# Patient Record
Sex: MALE | Race: WHITE | Employment: PART TIME | URBAN - METROPOLITAN AREA
[De-identification: names, ages, dates, MRNs, and addresses within clinical notes are randomized per-mention and may not be internally consistent; named-entity substitution may affect disease eponyms.]

---

## 2022-04-01 ENCOUNTER — NURSE TRIAGE (OUTPATIENT)
Dept: FAMILY MEDICINE | Facility: CLINIC | Age: 37
End: 2022-04-01
Payer: COMMERCIAL

## 2022-04-01 NOTE — TELEPHONE ENCOUNTER
"Received call from patient's mother, Yonathan. She states that her son has been vomiting daily for 2 weeks. He reportedly cannot keep anything down, is very resistant to going into the Emergency Room. She is calling to schedule an appointment to establish care with her own primary care provider, Naida Cedeno. Jesus has never been seen at [facility] before. He was triaged- see below. He was advised to go to the emergency room per protocol. Yonathan states she has been trying to get patient to ER for 5 days, however he is resistant. Writer reinforced the fact that he needs to be evaluated for possible dehydration/infection and he verbalized understanding.     Reason for Disposition    SEVERE vomiting (e.g., 6 or more times/day) (Exception: patient sounds well, is drinking liquids, does not sound dehydrated, and vomiting has lasted less than 24 hours)    Additional Information    Negative: Shock suspected (e.g., cold/pale/clammy skin, too weak to stand, low BP, rapid pulse)    Negative: Difficult to awaken or acting confused (e.g., disoriented, slurred speech)    Negative: Sounds like a life-threatening emergency to the triager    Negative: Vomiting occurs only while coughing    Negative: Pregnant < 20 Weeks and nausea/vomiting began in early pregnancy (i.e., 4-8 weeks pregnant)    Negative: Chest pain    Negative: Headache is main symptom    Answer Assessment - Initial Assessment Questions  1. VOMITING SEVERITY: \"How many times have you vomited in the past 24 hours?\"      - MILD:  1 - 2 times/day     - MODERATE: 3 - 5 times/day, decreased oral intake without significant weight loss or symptoms of dehydration     - SEVERE: 6 or more times/day, vomits everything or nearly everything, with significant weight loss, symptoms of dehydration       Severe.   2. ONSET: \"When did the vomiting begin?\"       2 weeks ago.   3. FLUIDS: \"What fluids or food have you vomited up today?\" \"Have you been able to keep any fluids " "down?\"      Not able to keep any fluids down.   4. ABDOMINAL PAIN: \"Are your having any abdominal pain?\" If yes : \"How bad is it and what does it feel like?\" (e.g., crampy, dull, intermittent, constant)       Yes, constant abdominal pain. 8/10.  5. DIARRHEA: \"Is there any diarrhea?\" If so, ask: \"How many times today?\"       Yes. Once today. That has been going on for 2 weeks as well.   6. CONTACTS: \"Is there anyone else in the family with the same symptoms?\"       No.   7. CAUSE: \"What do you think is causing your vomiting?\"      Unsure.   8. HYDRATION STATUS: \"Any signs of dehydration?\" (e.g., dry mouth [not only dry lips], too weak to stand) \"When did you last urinate?\"      Dry mouth, lips and too weak to stand. Can make it to the bathroom and that's it. This morning, slight amount of urine.   9. OTHER SYMPTOMS: \"Do you have any other symptoms?\" (e.g., fever, headache, vertigo, vomiting blood or coffee grounds, recent head injury)      Headaches, hot flashes, vomiting blood (size of a jr usually in the morning).   10. PREGNANCY: \"Is there any chance you are pregnant?\" \"When was your last menstrual period?\"        N/A.    Protocols used: VOMITING-A-OH    LETICIA Henao RN  [facility] [place]  "

## 2022-04-15 ENCOUNTER — OFFICE VISIT (OUTPATIENT)
Dept: FAMILY MEDICINE | Facility: CLINIC | Age: 37
End: 2022-04-15
Payer: COMMERCIAL

## 2022-04-15 ENCOUNTER — TELEPHONE (OUTPATIENT)
Dept: FAMILY MEDICINE | Facility: CLINIC | Age: 37
End: 2022-04-15

## 2022-04-15 VITALS
DIASTOLIC BLOOD PRESSURE: 125 MMHG | BODY MASS INDEX: 17.89 KG/M2 | WEIGHT: 114 LBS | SYSTOLIC BLOOD PRESSURE: 167 MMHG | HEIGHT: 67 IN | HEART RATE: 120 BPM | TEMPERATURE: 98.3 F | OXYGEN SATURATION: 99 % | RESPIRATION RATE: 16 BRPM

## 2022-04-15 DIAGNOSIS — F10.20 ALCOHOLISM (H): ICD-10-CM

## 2022-04-15 DIAGNOSIS — K92.0 HEMATEMESIS WITH NAUSEA: Primary | ICD-10-CM

## 2022-04-15 DIAGNOSIS — R05.9 COUGH: ICD-10-CM

## 2022-04-15 DIAGNOSIS — J02.9 SORE THROAT: ICD-10-CM

## 2022-04-15 PROCEDURE — 99205 OFFICE O/P NEW HI 60 MIN: CPT | Performed by: NURSE PRACTITIONER

## 2022-04-15 RX ORDER — ONDANSETRON 4 MG/1
4 TABLET, ORALLY DISINTEGRATING ORAL EVERY 8 HOURS PRN
Qty: 18 TABLET | Refills: 0 | Status: CANCELLED | OUTPATIENT
Start: 2022-04-15

## 2022-04-15 RX ORDER — LIDOCAINE HYDROCHLORIDE 20 MG/ML
15 SOLUTION OROPHARYNGEAL EVERY 6 HOURS PRN
Qty: 100 ML | Refills: 0 | Status: CANCELLED | OUTPATIENT
Start: 2022-04-15

## 2022-04-15 ASSESSMENT — PAIN SCALES - GENERAL: PAINLEVEL: SEVERE PAIN (6)

## 2022-04-15 ASSESSMENT — PATIENT HEALTH QUESTIONNAIRE - PHQ9: SUM OF ALL RESPONSES TO PHQ QUESTIONS 1-9: 21

## 2022-04-15 NOTE — TELEPHONE ENCOUNTER
"Patient has appointment today. Mom calling to report history she thinks provider should be aware of. Patient has been vomiting multiple times daily since 3/18. Mom states \"he is a functioning alcoholic.\" He also has had 4 alcohol related hospital encounters this year.   Routing to provider as ROJELIO NORIEGA RN  [facility]     "

## 2022-04-15 NOTE — PROGRESS NOTES
"  Assessment & Plan     Hematemesis with nausea  Cough  Sore throat  Patient with alcoholism (currently drinking 1/2 bottle of whiskey daily) and history of LA grade D esophagitis and tear at GE junction last year. Now with 4 weeks of hematemesis 2-3 times/daily and continuous nausea limiting his ability to eat. Counseled patient that his symptoms and vital signs are very concerning and he needs to go to the Emergency Department for evaluation and possible hospitalization. Patient declines ambulance and states that he will have his mother take him the Emergency Department today.    Alcoholism (H)  Offered patient referral to addiction medicine but he declines at this time.     Return today (on 4/15/2022) for Go to Emergency Department.    KAREN Fagan CNP  [facility] GERRI Lee is a 36 year old who presents for the following health issues     HPI     Acute Illness  Acute illness concerns: Cough and Vomiting with blood  Onset/Duration: 4 weeks   Symptoms:  Fever: no  Chills/Sweats: YES  Headache (location?): YES  Sinus Pressure: no  Conjunctivitis:  YES- bilateral  Ear Pain: no  Rhinorrhea: YES  Congestion: YES  Sore Throat: YES  Cough: YES-productive of clear sputum, worse when trying to eat  Wheeze: YES  Decreased Appetite: YES  Nausea: YES, has been able to eat very little food  Vomiting: YES with blood in vomit 2-3 times/daily  Diarrhea: YES  Dysuria/Freq.: no  Dysuria or Hematuria: no  Fatigue/Achiness: YES  Sick/Strep Exposure: no  Therapies tried and outcome: OTC Flu medications and antacid - no help.     Is currently drinking 1/2 bottle of whiskey daily.       Review of Systems   Constitutional, HEENT, cardiovascular, pulmonary, GI, , musculoskeletal, neuro, skin, endocrine and psych systems are negative, except as otherwise noted.      Objective    BP (!) 167/125   Pulse 120   Temp 98.3  F (36.8  C) (Oral)   Resp 16   Ht 1.713 m (5' 7.44\")   Wt 51.7 kg " (114 lb)   SpO2 99%   BMI 17.62 kg/m    Body mass index is 17.62 kg/m .  Physical Exam   GENERAL: alert, frail and fatigued  EYES: PERRL, EOMI and conjunctiva/corneas- conjunctival injection bilateral  RESP: lungs clear to auscultation - no rales, rhonchi or wheezes  CV: regular rate and rhythm, normal S1 S2, no S3 or S4, no murmur, click or rub  ABDOMEN: tenderness epigastric and bowel sounds normal  PSYCH: tearful and fatigued

## 2022-04-25 ENCOUNTER — TELEPHONE (OUTPATIENT)
Dept: FAMILY MEDICINE | Facility: CLINIC | Age: 37
End: 2022-04-25

## 2022-04-25 ENCOUNTER — OFFICE VISIT (OUTPATIENT)
Dept: FAMILY MEDICINE | Facility: CLINIC | Age: 37
End: 2022-04-25
Payer: COMMERCIAL

## 2022-04-25 VITALS
RESPIRATION RATE: 18 BRPM | BODY MASS INDEX: 18.75 KG/M2 | HEART RATE: 109 BPM | TEMPERATURE: 98.7 F | HEIGHT: 67 IN | OXYGEN SATURATION: 97 % | SYSTOLIC BLOOD PRESSURE: 138 MMHG | WEIGHT: 119.5 LBS | DIASTOLIC BLOOD PRESSURE: 92 MMHG

## 2022-04-25 DIAGNOSIS — K12.30 STOMATITIS AND MUCOSITIS: ICD-10-CM

## 2022-04-25 DIAGNOSIS — F41.9 ANXIETY: ICD-10-CM

## 2022-04-25 DIAGNOSIS — K12.1 STOMATITIS AND MUCOSITIS: ICD-10-CM

## 2022-04-25 DIAGNOSIS — F10.20 SEVERE ALCOHOL USE DISORDER (H): ICD-10-CM

## 2022-04-25 DIAGNOSIS — Z23 HIGH PRIORITY FOR 2019-NCOV VACCINE: ICD-10-CM

## 2022-04-25 DIAGNOSIS — Z09 HOSPITAL DISCHARGE FOLLOW-UP: Primary | ICD-10-CM

## 2022-04-25 DIAGNOSIS — K29.21 CHRONIC ALCOHOLIC GASTRITIS WITH HEMORRHAGE: ICD-10-CM

## 2022-04-25 DIAGNOSIS — I10 BENIGN ESSENTIAL HYPERTENSION: ICD-10-CM

## 2022-04-25 DIAGNOSIS — F33.1 MODERATE EPISODE OF RECURRENT MAJOR DEPRESSIVE DISORDER (H): ICD-10-CM

## 2022-04-25 DIAGNOSIS — E53.9 VITAMIN B DEFICIENCY: ICD-10-CM

## 2022-04-25 DIAGNOSIS — R00.0 SINUS TACHYCARDIA: ICD-10-CM

## 2022-04-25 DIAGNOSIS — G47.00 PERSISTENT INSOMNIA: ICD-10-CM

## 2022-04-25 PROCEDURE — 99215 OFFICE O/P EST HI 40 MIN: CPT | Mod: 25 | Performed by: NURSE PRACTITIONER

## 2022-04-25 PROCEDURE — 0054A COVID-19,PF,PFIZER (12+ YRS): CPT | Performed by: NURSE PRACTITIONER

## 2022-04-25 PROCEDURE — 91305 COVID-19,PF,PFIZER (12+ YRS): CPT | Performed by: NURSE PRACTITIONER

## 2022-04-25 RX ORDER — PANTOPRAZOLE SODIUM 40 MG/1
40 TABLET, DELAYED RELEASE ORAL
COMMUNITY
Start: 2022-04-18 | End: 2022-04-25

## 2022-04-25 RX ORDER — ACAMPROSATE CALCIUM 333 MG/1
666 TABLET, DELAYED RELEASE ORAL
COMMUNITY
Start: 2022-04-18 | End: 2022-05-18

## 2022-04-25 RX ORDER — SERTRALINE HYDROCHLORIDE 100 MG/1
100 TABLET, FILM COATED ORAL DAILY
Qty: 90 TABLET | Refills: 1 | Status: ON HOLD | OUTPATIENT
Start: 2022-04-25 | End: 2022-06-25

## 2022-04-25 RX ORDER — HYDROXYZINE PAMOATE 25 MG/1
50 CAPSULE ORAL
COMMUNITY
Start: 2022-04-18 | End: 2022-04-25

## 2022-04-25 RX ORDER — PANTOPRAZOLE SODIUM 40 MG/1
40 TABLET, DELAYED RELEASE ORAL
Qty: 60 TABLET | Refills: 1 | Status: ON HOLD | OUTPATIENT
Start: 2022-04-25 | End: 2022-06-25

## 2022-04-25 RX ORDER — MULTIVITAMIN
1 TABLET ORAL DAILY
Qty: 90 TABLET | Refills: 3 | Status: SHIPPED | OUTPATIENT
Start: 2022-04-25

## 2022-04-25 RX ORDER — LANOLIN ALCOHOL/MO/W.PET/CERES
100 CREAM (GRAM) TOPICAL
COMMUNITY
End: 2022-04-25

## 2022-04-25 RX ORDER — METOPROLOL SUCCINATE 25 MG/1
25 TABLET, EXTENDED RELEASE ORAL DAILY
Qty: 90 TABLET | Refills: 1 | Status: SHIPPED | OUTPATIENT
Start: 2022-04-25

## 2022-04-25 RX ORDER — LANOLIN ALCOHOL/MO/W.PET/CERES
100 CREAM (GRAM) TOPICAL DAILY
Qty: 90 TABLET | Refills: 3 | Status: SHIPPED | OUTPATIENT
Start: 2022-04-25

## 2022-04-25 RX ORDER — HYDROXYZINE PAMOATE 50 MG/1
50 CAPSULE ORAL 4 TIMES DAILY PRN
Qty: 90 CAPSULE | Refills: 1 | Status: SHIPPED | OUTPATIENT
Start: 2022-04-25

## 2022-04-25 RX ORDER — NICOTINE 14 MG/24H
PATCH, EXTENDED RELEASE TRANSDERMAL
COMMUNITY
Start: 2022-04-18

## 2022-04-25 RX ORDER — PANTOPRAZOLE SODIUM 40 MG/1
TABLET, DELAYED RELEASE ORAL
COMMUNITY
Start: 2022-04-18 | End: 2022-04-25

## 2022-04-25 ASSESSMENT — PATIENT HEALTH QUESTIONNAIRE - PHQ9
SUM OF ALL RESPONSES TO PHQ QUESTIONS 1-9: 10
SUM OF ALL RESPONSES TO PHQ QUESTIONS 1-9: 10
10. IF YOU CHECKED OFF ANY PROBLEMS, HOW DIFFICULT HAVE THESE PROBLEMS MADE IT FOR YOU TO DO YOUR WORK, TAKE CARE OF THINGS AT HOME, OR GET ALONG WITH OTHER PEOPLE: NOT DIFFICULT AT ALL

## 2022-04-25 ASSESSMENT — ANXIETY QUESTIONNAIRES
5. BEING SO RESTLESS THAT IT IS HARD TO SIT STILL: SEVERAL DAYS
GAD7 TOTAL SCORE: 7
GAD7 TOTAL SCORE: 7
6. BECOMING EASILY ANNOYED OR IRRITABLE: SEVERAL DAYS
4. TROUBLE RELAXING: SEVERAL DAYS
1. FEELING NERVOUS, ANXIOUS, OR ON EDGE: SEVERAL DAYS
3. WORRYING TOO MUCH ABOUT DIFFERENT THINGS: SEVERAL DAYS
7. FEELING AFRAID AS IF SOMETHING AWFUL MIGHT HAPPEN: SEVERAL DAYS
7. FEELING AFRAID AS IF SOMETHING AWFUL MIGHT HAPPEN: SEVERAL DAYS
2. NOT BEING ABLE TO STOP OR CONTROL WORRYING: SEVERAL DAYS
GAD7 TOTAL SCORE: 7

## 2022-04-25 NOTE — TELEPHONE ENCOUNTER
Left message stating if patient would like to  his letter or have it emailed.       Ann Marie Sandoval MA

## 2022-04-25 NOTE — TELEPHONE ENCOUNTER
Reached out to [facility] pharmacy staff to notify them of provider's message as written.    Radha Hidalgo, JAIRON RN  [facility]

## 2022-04-25 NOTE — TELEPHONE ENCOUNTER
[facility] calling to confirm that it is ok to dispense 1:1:1 ratio of Magic Mouthwash as it is a compound medication. Please advise.     Marimar Olsen RN   [facility]

## 2022-04-25 NOTE — PROGRESS NOTES
Assessment & Plan     1. Hospital discharge follow-up  Hospital admission at [facility] 4/15-4/18 alcoholic gastritis with hemorrhage.    2. Chronic alcoholic gastritis with hemorrhage  Hospital admission at [facility] 4/15-4/18 alcoholic gastritis with hemorrhage. Treated with IV PPI and bleeding stopped. Discharged on oral pantoprazole. Bleeding has not reoccurred and he has been able to eat foods and drink water without vomiting. Continue pantoprazole 40 mg BID for the next 6-8 weeks. Counseled patient that abstaining from alcohol is recommended to prevent worsening symptoms, future hospitalizations, or early death.   - pantoprazole (PROTONIX) 40 MG EC tablet; Take 1 tablet (40 mg) by mouth 2 times daily (before meals)  Dispense: 60 tablet; Refill: 1  - Adult Mental Health  Referral; Future  - Adult Mental Health  Referral; Future    3. Severe alcohol use disorder (H)  Patient drinking 1/2-1 bottle of whiskey daily. Last drink was 3-4 glasses yesterday. Patient has not noticed a reduction in alcohol cravings with acamprosate. Will continue the acamprosate for now and patient agrees to addiction medicine referral to discuss options for treatment. He states that he will not consider in-patient treatment but will consider outpatient treatment, therapy, and medications.   - Adult Mental Health  Referral; Future  - Adult Mental Health  Referral; Future    4. Stomatitis and mucositis  Magic mouthwash is helping mouth and throat pain so that he is able to eat and drink water. Continue PRN.  - magic mouthwash suspension (diphenhydrAMINE, lidocaine, aluminum-magnesium & simethicone); Swish and spit 10 mLs in mouth every 6 hours as needed for mouth sores or mild pain  Dispense: 120 mL; Refill: 3    5. Vitamin B deficiency  Continue supplements below  - thiamine (B-1) 100 MG tablet; Take 1 tablet (100 mg) by mouth daily  Dispense: 90 tablet; Refill: 3  - vitamin B-12 (CYANOCOBALAMIN) 1000 MCG  tablet; Take 1 tablet (1,000 mcg) by mouth daily  Dispense: 90 tablet; Refill: 3  - multivitamin (ONE-DAILY) tablet; Take 1 tablet by mouth daily  Dispense: 90 tablet; Refill: 3    6. Moderate episode of recurrent major depressive disorder (H)  Has not noticed improvement in depression symptoms with sertraline but denies side effects. Will increase dose as noted below. He is interested in starting therapy.   - sertraline (ZOLOFT) 100 MG tablet; Take 1 tablet (100 mg) by mouth daily  Dispense: 90 tablet; Refill: 1  [facility]; [facility]  - [facility] Referral; Future    7. Anxiety  Hydroxyzine helps with anxiety. Continue PRN.   - sertraline (ZOLOFT) 100 MG tablet; Take 1 tablet (100 mg) by mouth daily  Dispense: 90 tablet; Refill: 1  - hydrOXYzine (VISTARIL) 50 MG capsule; Take 1 capsule (50 mg) by mouth 4 times daily as needed for anxiety  Dispense: 90 capsule; Refill: 1  - [facility] Referral; Future  - [facility] Referral; Future    8. Persistent insomnia  Trazodone did not help with his insomnia. He has been taking Tylenol PM. I would like him to avoid the Tylenol to protect his liver. Will send Rx for doxylamine, if not covered by insurance he can pick this up OTC.   - [facility] Referral; Future  - doxylamine (UNISOM) 25 MG TABS tablet; Take 2 tablets (50 mg) by mouth nightly as needed for sleep  Dispense: 60 tablet; Refill: 1  - [facility] Referral; Future    9. Benign essential hypertension  This is a new diagnosis for him. BPs elevated prior to and during hospitalization and remain elevated. He also has resting sinus tachycardia. Want to avoid CCB at this time to protect his liver so will start BB therapy for both BP and HR control.  - metoprolol succinate ER (TOPROL-XL) 25 MG 24 hr tablet; Take 1 tablet (25 mg) by mouth daily  Dispense: 90 tablet; Refill: 1    10. Sinus tachycardia  See  above.   - metoprolol succinate ER (TOPROL-XL) 25 MG 24 hr tablet; Take 1 tablet (25 mg) by mouth daily  Dispense: 90 tablet; Refill: 1    11. High priority for 2019-nCoV vaccine  - COVID-19,PF,PFIZER (12+ Yrs GRAY LABEL)    Review of external notes as documented elsewhere in note  Review of the result(s) of each unique test - hospital labs (k, mag, cbc, liver function, etc)  Diagnosis or treatment significantly limited by social determinants of health - addiction  Prescription drug management  56 minutes spent on the date of the encounter doing chart review, history and exam, documentation and further activities per the note     Tobacco Cessation:   reports that he has been smoking cigarettes. He has never used smokeless tobacco.  Tobacco Cessation Action Plan: Information offered: Patient not interested at this time    Depression Screening Follow Up    PHQ 4/25/2022   PHQ-9 Total Score 10   Q9: Thoughts of better off dead/self-harm past 2 weeks Not at all     Last PHQ-9 4/25/2022   1.  Little interest or pleasure in doing things 1   2.  Feeling down, depressed, or hopeless 3   3.  Trouble falling or staying asleep, or sleeping too much 3   4.  Feeling tired or having little energy 1   5.  Poor appetite or overeating 1   6.  Feeling bad about yourself 0   7.  Trouble concentrating 1   8.  Moving slowly or restless 0   Q9: Thoughts of better off dead/self-harm past 2 weeks 0   PHQ-9 Total Score 10   Difficulty at work, home, or with people -       Follow Up Actions Taken  Mental Health Referral placed  Follow up recommended: as noted below     Return in about 4 weeks (around 5/23/2022) for hypertension and depression.    KAREN Fagan [facility] GERRI Lee is a 36 year old who presents for the following health issues  accompanied by his none.    History of Present Illness       Mental Health Follow-up:  Patient presents to follow-up on Depression & Anxiety.Patient's  depression since last visit has been:  Medium  The patient is having other symptoms associated with depression.  Patient's anxiety since last visit has been:  Medium  The patient is not having other symptoms associated with anxiety.  Any significant life events: health concerns  Patient is not feeling anxious or having panic attacks.  Patient has concerns about alcohol or drug use.       Today's PHQ-9         PHQ-9 Total Score: 10  PHQ-9 Q9 Thoughts of better off dead/self-harm past 2 weeks :   (P) Not at all    How difficult have these problems made it for you to do your work, take care of things at home, or get along with other people: Not difficult at all    Today's GENA-7 Score: 7    Hypertension: He presents for follow up of hypertension.  He does check blood pressure  regularly outside of the clinic. Outside blood pressures have been over 140/90. He does not follow a low salt diet.     He eats 0-1 servings of fruits and vegetables daily.He consumes 1 sweetened beverage(s) daily.He exercises with enough effort to increase his heart rate 20 to 29 minutes per day.  He exercises with enough effort to increase his heart rate 7 days per week.   He is taking medications regularly.         Hospital Follow-up Visit:    Hospital/Nursing Home/IP Rehab Facility: [facility]  Date of Admission: 4/15/22  Date of Discharge: 4/18/22  Reason(s) for Admission: alcoholic gastritis, Alcohol withdrawal       Was your hospitalization related to COVID-19? No   Problems taking medications regularly:  None  Medication changes since discharge:   Discharge Medications       Your Home Medicines     START taking these medicines   Instructions   acamprosate 333 mg tablet  For diagnoses: Alcohol dependence with other alcohol-induced disorder (HC)  Commonly known as: CAMPRAL  Take 2 Tablets (666 mg) by mouth 3 times daily.    hydrOXYzine pamoate 25 mg capsule  For diagnoses: Anxiety  Commonly known as: VISTARIL  Take 2 Capsules (50  mg) by mouth every 6 hours if needed for Anxiety.    magic mouthwash with nystatin and lidocaine Susp  For diagnoses: Stomatitis  Commonly known as: [facility] SPECIAL MIXTURE  Take 5-10 mL by mouth 4 times daily if needed for Stomatitis.    nicotine 14 mg/24 hr 14 mg/24 hr patch  For diagnoses: Tobacco abuse  Start taking on: April 19, 2022  Commonly known as: NICODERM; HABITROL  Apply 1 Patch on dry, clean, hairless skin once daily.    pantoprazole 40 mg delayed-release tablet  For diagnoses: Gastritis, presence of bleeding unspecified, unspecified chronicity, unspecified gastritis type  Commonly known as: PROTONIX  Take 1 Tablet (40 mg) by mouth 2 times daily before meals.  Doctor's comments: Can switch to equivalent dose of omeprazole if more affordable    sertraline 50 mg tablet  For diagnoses: Depression, unspecified depression type  Start taking on: April 19, 2022  Commonly known as: ZOLOFT  Take 1 Tablet (50 mg) by mouth every morning.    traZODone 50 mg tablet  For diagnoses: Insomnia, unspecified type  Commonly known as: DESYREL  Take 1 Tablet (50 mg) by mouth at bedtime if needed for Sleep.      CONTINUE taking these medicines   Instructions   cyanocobalamin 1,000 mcg tablet  Commonly known as: VITAMIN B12  Take 1,000 mcg by mouth once daily.    MEN'S DAILY MULTIVIT-MINERAL ORAL  Take 1 Tablet by mouth once daily.    Vitamin B-1 100 mg tablet  Generic drug: thiamine  Take 100 mg by mouth once daily.      STOP taking these medicines   famotidine 10 mg tablet  Commonly known as: PEPCID    Tylenol PM Extra Strength  mg tablet  Generic drug: diphenhydrAMINE-acetaminophen  mg    Patient stopped the trazodone because he was waking in the middle of the night, tossing, turning so he stopped it and no change in his sleep.     Last alcohol was 3-4 drinks yesterday.     Problems adhering to non-medication therapy:  None    Summary of hospitalization:  CareEverywhere information obtained and reviewed  Hospital  "Course     Jesus Martinez is a 36 y.o. male with a history of Alcohol use disorder who presented with 3 weeks of epigastric pain, intractable vomiting, and melena.    Treated for gastritis with IV ppi as well as supportive care, intravenous fluids resuscitation. Melena resolved. Patient reported ongoing anxiety, depression, and insomnia. He was started on a medication regimen for the like with plan for close follow up.     He was proved resources for alcohol cessation, chose to pursue outpatient treatment.     Recommendations for Outpatient Provider   PCP: [facility]     Recommendations for outpatient provider   Specific recommendations to be addressed at the follow up visit:  Gastritis with melena - Treating with twice daily PPI - can reduce to once daily in the next 4-6 weeks   Anxiety, depression, insomnia - ordered a regimen, titrate as needed  Alcohol dependence - trying acamprosate for cravings, outpatient chem dep program    Medication regimen changes: see \"Hospital Course\" above.    Follow-up labs/imaging: none    Other specialty follow-up not included in DC orders: None    Special considerations: none.    Functional evaluations:   Fall Risk: Total Score (If 5 or > is High Risk): 3 (04/18/22 0915)  NuDESC (>/=2 abnormal): 0 (04/18/22 0915)   MOCA: // SLUMS:      Diagnostic Tests/Treatments reviewed.  Follow up needed: addiction medicine  Other Healthcare Providers Involved in Patient s Care:         None  Update since discharge: improved.     Post Discharge Medication Reconciliation: discharge medications reconciled and changed, per note/orders.  Plan of care communicated with patient          Review of Systems   Constitutional, HEENT, cardiovascular, pulmonary, gi and gu systems are negative, except as otherwise noted.      Objective    BP (!) 138/92   Pulse 109   Temp 98.7  F (37.1  C) (Oral)   Resp 18   Ht 1.713 m (5' 7.44\")   Wt 54.2 kg (119 lb 8 oz)   SpO2 97%   BMI 18.47 kg/m  "   Body mass index is 18.47 kg/m .  Physical Exam   GENERAL: healthy, alert and no distress  EYES: Eyes grossly normal to inspection, PERRL and conjunctivae and sclerae normal  NEURO: Normal strength and tone, mentation intact and speech normal  PSYCH: mentation appears normal, affect normal/bright

## 2022-04-26 ASSESSMENT — ANXIETY QUESTIONNAIRES: GAD7 TOTAL SCORE: 7

## 2022-04-26 ASSESSMENT — PATIENT HEALTH QUESTIONNAIRE - PHQ9: SUM OF ALL RESPONSES TO PHQ QUESTIONS 1-9: 10

## 2022-05-25 ENCOUNTER — NURSE TRIAGE (OUTPATIENT)
Dept: NURSING | Facility: CLINIC | Age: 37
End: 2022-05-25
Payer: COMMERCIAL

## 2022-05-25 NOTE — TELEPHONE ENCOUNTER
Called and spoke with Mother, Yonathan. Pt gave verbal consent to speak with her. Pt was given provider's message as written, then pt asked for his Mom to go on the line. Yonathan states that pt went to the ER a few days ago and they sent him home with allergy medication. Pt did not have a good experience in ER. Mom states she feels pt is stereotyped when he is in the ER because of his history of alcoholism.   States this issue has been going on since 3/18. Can't eat anything. Is only drinking water. Is concerned about his nutrition. Mouth is bleeding. Pt is crying because he's in so much pain. Offered an appt for today. Mother declined stating she would like pt to be seen by an MD and has an appt scheduled for tomorrow with Dr. Ashley. States she will bring pt to  of  ER today if symptoms worsen. She will also try liquid antacid such as mylanta to coat his mouth and see if she can get him to eat some soft foods. Could try ensure. Mom has given him pedialyte and did try ice cream yesterday.    Martha Doss RN  [facility]

## 2022-05-25 NOTE — TELEPHONE ENCOUNTER
Note there is no consent to communicate with pt's Mom. Will need verbal consent from pt.     Routing to PCP to advise. No openings with PCP today.    Martha Doss RN  [facility]

## 2022-05-25 NOTE — TELEPHONE ENCOUNTER
Provider Response to 2nd Level Triage Request    I have reviewed the RN documentation. My recommendation is:  Virtual Visit or inperson visit within the next week with me.  Go to ED if develops vomiting, blood in vomit or stool, fever, inability to keep any food or water down.    Naida Cedeno, APRN, CNP

## 2022-05-25 NOTE — TELEPHONE ENCOUNTER
"Mom calling.  Patient saw Naida Cedeno and told to see in ER. He has an infection in mouth. Was hospitalized. Took meds, followed up with Naida. Went to ER over the weekend. No one listened to him but treated him for an allergy. Concern is he's skinny and weighs less than 100 lbs. He's not been able to eat.  Should he be seen again by Naida? Nothing is working. He needs help. After triage, I recommended an appointment today as well as a call back from Naida Cedeno, CNP, team. He doesn't want to go back to the ER because they only treated allergies. Yonathan, Mom, can be reached at:  [phone]. May leave a detailed message. I told her to take the first available appointment with any provider.  Irene Alegre RN  [facility]      Reason for Disposition    Gums are red, painful and have many ulcers    Additional Information    Negative: [1] Looks like fever blisters (\"cold sore\") AND [2] only on outer lip    Negative: Generalized skin rash from Chickenpox    Negative: Chemical in the mouth suspected cause of ulcers    Negative: [1] Drinking very little AND [2] dehydration suspected (e.g., no urine > 12 hours, very dry mouth, very lightheaded)    Negative: Generalized rash on body    Negative: Patient sounds very sick or weak to the triager    Negative: Large blisters in mouth (i.e., fluid filled bubbles or sacs)    Protocols used: MOUTH ULCERS-A-AH      "

## 2022-06-20 ENCOUNTER — APPOINTMENT (OUTPATIENT)
Dept: ULTRASOUND IMAGING | Facility: CLINIC | Age: 37
End: 2022-06-20
Attending: EMERGENCY MEDICINE
Payer: COMMERCIAL

## 2022-06-20 ENCOUNTER — HOSPITAL ENCOUNTER (INPATIENT)
Facility: CLINIC | Age: 37
LOS: 5 days | Discharge: HOME | End: 2022-06-25
Attending: EMERGENCY MEDICINE | Admitting: INTERNAL MEDICINE
Payer: COMMERCIAL

## 2022-06-20 DIAGNOSIS — Z20.822 COVID-19 RULED OUT BY LABORATORY TESTING: ICD-10-CM

## 2022-06-20 DIAGNOSIS — F10.20 ALCOHOLISM (H): Primary | ICD-10-CM

## 2022-06-20 DIAGNOSIS — E87.6 HYPOKALEMIA: ICD-10-CM

## 2022-06-20 DIAGNOSIS — F10.20 SEVERE ALCOHOL DEPENDENCE (H): ICD-10-CM

## 2022-06-20 DIAGNOSIS — R79.89 ELEVATED LIVER FUNCTION TESTS: ICD-10-CM

## 2022-06-20 DIAGNOSIS — K29.00 ACUTE SUPERFICIAL GASTRITIS WITHOUT HEMORRHAGE: ICD-10-CM

## 2022-06-20 DIAGNOSIS — E83.42 HYPOMAGNESEMIA: ICD-10-CM

## 2022-06-20 DIAGNOSIS — F41.9 ANXIETY: ICD-10-CM

## 2022-06-20 DIAGNOSIS — B37.0 THRUSH: ICD-10-CM

## 2022-06-20 DIAGNOSIS — F10.932 ALCOHOL WITHDRAWAL SYNDROME WITH PERCEPTUAL DISTURBANCE (H): ICD-10-CM

## 2022-06-20 DIAGNOSIS — R94.31 PROLONGED Q-T INTERVAL ON ECG: ICD-10-CM

## 2022-06-20 DIAGNOSIS — K90.9 DIARRHEA DUE TO MALABSORPTION: ICD-10-CM

## 2022-06-20 DIAGNOSIS — R19.7 DIARRHEA DUE TO MALABSORPTION: ICD-10-CM

## 2022-06-20 DIAGNOSIS — F33.1 MODERATE EPISODE OF RECURRENT MAJOR DEPRESSIVE DISORDER (H): ICD-10-CM

## 2022-06-20 LAB
ALBUMIN SERPL-MCNC: 4.2 G/DL (ref 3.4–5)
ALBUMIN UR-MCNC: NEGATIVE MG/DL
ALP SERPL-CCNC: 260 U/L (ref 40–150)
ALT SERPL W P-5'-P-CCNC: 128 U/L (ref 0–70)
AMPHETAMINES UR QL SCN: ABNORMAL
ANION GAP SERPL CALCULATED.3IONS-SCNC: 18 MMOL/L (ref 3–14)
APPEARANCE UR: CLEAR
AST SERPL W P-5'-P-CCNC: 294 U/L (ref 0–45)
BARBITURATES UR QL: ABNORMAL
BASOPHILS # BLD AUTO: 0.2 10E3/UL (ref 0–0.2)
BASOPHILS NFR BLD AUTO: 1 %
BENZODIAZ UR QL: ABNORMAL
BILIRUB SERPL-MCNC: 4.8 MG/DL (ref 0.2–1.3)
BILIRUB UR QL STRIP: NEGATIVE
BUN SERPL-MCNC: 4 MG/DL (ref 7–30)
CALCIUM SERPL-MCNC: 9.8 MG/DL (ref 8.5–10.1)
CANNABINOIDS UR QL SCN: ABNORMAL
CHLORIDE BLD-SCNC: 97 MMOL/L (ref 94–109)
CO2 SERPL-SCNC: 21 MMOL/L (ref 20–32)
COCAINE UR QL: ABNORMAL
COLOR UR AUTO: ABNORMAL
CREAT SERPL-MCNC: 0.94 MG/DL (ref 0.66–1.25)
EOSINOPHIL # BLD AUTO: 0 10E3/UL (ref 0–0.7)
EOSINOPHIL NFR BLD AUTO: 0 %
ERYTHROCYTE [DISTWIDTH] IN BLOOD BY AUTOMATED COUNT: 12 % (ref 10–15)
ETHANOL SERPL-MCNC: <0.01 G/DL
GFR SERPL CREATININE-BSD FRML MDRD: >90 ML/MIN/1.73M2
GLUCOSE BLD-MCNC: 131 MG/DL (ref 70–99)
GLUCOSE UR STRIP-MCNC: NEGATIVE MG/DL
HCT VFR BLD AUTO: 47.8 % (ref 40–53)
HGB BLD-MCNC: 17.4 G/DL (ref 13.3–17.7)
HGB UR QL STRIP: NEGATIVE
HOLD SPECIMEN: NORMAL
HYALINE CASTS: 38 /LPF
IMM GRANULOCYTES # BLD: 0.1 10E3/UL
IMM GRANULOCYTES NFR BLD: 0 %
INR PPP: 0.98 (ref 0.85–1.15)
KETONES BLD-SCNC: 0.8 MMOL/L (ref 0–0.6)
KETONES UR STRIP-MCNC: NEGATIVE MG/DL
LEUKOCYTE ESTERASE UR QL STRIP: NEGATIVE
LIPASE SERPL-CCNC: 206 U/L (ref 73–393)
LYMPHOCYTES # BLD AUTO: 0.7 10E3/UL (ref 0.8–5.3)
LYMPHOCYTES NFR BLD AUTO: 4 %
MAGNESIUM SERPL-MCNC: 1.4 MG/DL (ref 1.6–2.3)
MAGNESIUM SERPL-MCNC: 2.4 MG/DL (ref 1.6–2.3)
MCH RBC QN AUTO: 34.4 PG (ref 26.5–33)
MCHC RBC AUTO-ENTMCNC: 36.4 G/DL (ref 31.5–36.5)
MCV RBC AUTO: 95 FL (ref 78–100)
MONOCYTES # BLD AUTO: 1.9 10E3/UL (ref 0–1.3)
MONOCYTES NFR BLD AUTO: 12 %
MUCOUS THREADS #/AREA URNS LPF: PRESENT /LPF
NEUTROPHILS # BLD AUTO: 13.2 10E3/UL (ref 1.6–8.3)
NEUTROPHILS NFR BLD AUTO: 83 %
NITRATE UR QL: NEGATIVE
NRBC # BLD AUTO: 0 10E3/UL
NRBC BLD AUTO-RTO: 0 /100
OPIATES UR QL SCN: ABNORMAL
PH UR STRIP: 7.5 [PH] (ref 5–7)
PHOSPHATE SERPL-MCNC: 2.4 MG/DL (ref 2.5–4.5)
PLATELET # BLD AUTO: 217 10E3/UL (ref 150–450)
POTASSIUM BLD-SCNC: 2.9 MMOL/L (ref 3.4–5.3)
POTASSIUM BLD-SCNC: 2.9 MMOL/L (ref 3.4–5.3)
PROT SERPL-MCNC: 8.7 G/DL (ref 6.8–8.8)
RBC # BLD AUTO: 5.06 10E6/UL (ref 4.4–5.9)
RBC URINE: 1 /HPF
SARS-COV-2 RNA RESP QL NAA+PROBE: NEGATIVE
SODIUM SERPL-SCNC: 136 MMOL/L (ref 133–144)
SP GR UR STRIP: 1.01 (ref 1–1.03)
UROBILINOGEN UR STRIP-MCNC: NORMAL MG/DL
WBC # BLD AUTO: 16 10E3/UL (ref 4–11)
WBC URINE: 0 /HPF

## 2022-06-20 PROCEDURE — 120N000003 HC R&B IMCU UMMC

## 2022-06-20 PROCEDURE — 99285 EMERGENCY DEPT VISIT HI MDM: CPT | Mod: 25 | Performed by: EMERGENCY MEDICINE

## 2022-06-20 PROCEDURE — 84132 ASSAY OF SERUM POTASSIUM: CPT

## 2022-06-20 PROCEDURE — 93010 ELECTROCARDIOGRAM REPORT: CPT | Performed by: EMERGENCY MEDICINE

## 2022-06-20 PROCEDURE — 99292 CRITICAL CARE ADDL 30 MIN: CPT | Mod: 25 | Performed by: EMERGENCY MEDICINE

## 2022-06-20 PROCEDURE — 96366 THER/PROPH/DIAG IV INF ADDON: CPT | Performed by: EMERGENCY MEDICINE

## 2022-06-20 PROCEDURE — C9803 HOPD COVID-19 SPEC COLLECT: HCPCS | Performed by: EMERGENCY MEDICINE

## 2022-06-20 PROCEDURE — 76705 ECHO EXAM OF ABDOMEN: CPT

## 2022-06-20 PROCEDURE — 36415 COLL VENOUS BLD VENIPUNCTURE: CPT | Performed by: EMERGENCY MEDICINE

## 2022-06-20 PROCEDURE — 96375 TX/PRO/DX INJ NEW DRUG ADDON: CPT | Performed by: EMERGENCY MEDICINE

## 2022-06-20 PROCEDURE — 99223 1ST HOSP IP/OBS HIGH 75: CPT | Mod: AI | Performed by: INTERNAL MEDICINE

## 2022-06-20 PROCEDURE — 99291 CRITICAL CARE FIRST HOUR: CPT | Mod: 25 | Performed by: EMERGENCY MEDICINE

## 2022-06-20 PROCEDURE — 81001 URINALYSIS AUTO W/SCOPE: CPT | Performed by: EMERGENCY MEDICINE

## 2022-06-20 PROCEDURE — 96368 THER/DIAG CONCURRENT INF: CPT | Performed by: EMERGENCY MEDICINE

## 2022-06-20 PROCEDURE — 84100 ASSAY OF PHOSPHORUS: CPT | Performed by: EMERGENCY MEDICINE

## 2022-06-20 PROCEDURE — 36415 COLL VENOUS BLD VENIPUNCTURE: CPT

## 2022-06-20 PROCEDURE — 76705 ECHO EXAM OF ABDOMEN: CPT | Mod: 26 | Performed by: RADIOLOGY

## 2022-06-20 PROCEDURE — U0003 INFECTIOUS AGENT DETECTION BY NUCLEIC ACID (DNA OR RNA); SEVERE ACUTE RESPIRATORY SYNDROME CORONAVIRUS 2 (SARS-COV-2) (CORONAVIRUS DISEASE [COVID-19]), AMPLIFIED PROBE TECHNIQUE, MAKING USE OF HIGH THROUGHPUT TECHNOLOGIES AS DESCRIBED BY CMS-2020-01-R: HCPCS | Performed by: EMERGENCY MEDICINE

## 2022-06-20 PROCEDURE — 85025 COMPLETE CBC W/AUTO DIFF WBC: CPT | Performed by: EMERGENCY MEDICINE

## 2022-06-20 PROCEDURE — 83690 ASSAY OF LIPASE: CPT | Performed by: EMERGENCY MEDICINE

## 2022-06-20 PROCEDURE — 93005 ELECTROCARDIOGRAM TRACING: CPT | Performed by: EMERGENCY MEDICINE

## 2022-06-20 PROCEDURE — 83735 ASSAY OF MAGNESIUM: CPT | Performed by: EMERGENCY MEDICINE

## 2022-06-20 PROCEDURE — HZ2ZZZZ DETOXIFICATION SERVICES FOR SUBSTANCE ABUSE TREATMENT: ICD-10-PCS | Performed by: EMERGENCY MEDICINE

## 2022-06-20 PROCEDURE — 80307 DRUG TEST PRSMV CHEM ANLYZR: CPT | Performed by: EMERGENCY MEDICINE

## 2022-06-20 PROCEDURE — 96361 HYDRATE IV INFUSION ADD-ON: CPT | Performed by: EMERGENCY MEDICINE

## 2022-06-20 PROCEDURE — 999N000128 HC STATISTIC PERIPHERAL IV START W/O US GUIDANCE

## 2022-06-20 PROCEDURE — 250N000011 HC RX IP 250 OP 636: Performed by: EMERGENCY MEDICINE

## 2022-06-20 PROCEDURE — 250N000009 HC RX 250: Performed by: EMERGENCY MEDICINE

## 2022-06-20 PROCEDURE — 82010 KETONE BODYS QUAN: CPT | Performed by: EMERGENCY MEDICINE

## 2022-06-20 PROCEDURE — 250N000013 HC RX MED GY IP 250 OP 250 PS 637: Performed by: EMERGENCY MEDICINE

## 2022-06-20 PROCEDURE — 85610 PROTHROMBIN TIME: CPT | Performed by: EMERGENCY MEDICINE

## 2022-06-20 PROCEDURE — 80053 COMPREHEN METABOLIC PANEL: CPT | Performed by: EMERGENCY MEDICINE

## 2022-06-20 PROCEDURE — 82077 ASSAY SPEC XCP UR&BREATH IA: CPT | Performed by: EMERGENCY MEDICINE

## 2022-06-20 PROCEDURE — 258N000003 HC RX IP 258 OP 636: Performed by: EMERGENCY MEDICINE

## 2022-06-20 PROCEDURE — 96365 THER/PROPH/DIAG IV INF INIT: CPT | Performed by: EMERGENCY MEDICINE

## 2022-06-20 PROCEDURE — 250N000011 HC RX IP 250 OP 636: Performed by: PHYSICIAN ASSISTANT

## 2022-06-20 PROCEDURE — 83735 ASSAY OF MAGNESIUM: CPT

## 2022-06-20 PROCEDURE — 99207 PR APP CREDIT; MD BILLING SHARED VISIT: CPT

## 2022-06-20 RX ORDER — FLUMAZENIL 0.1 MG/ML
0.2 INJECTION, SOLUTION INTRAVENOUS
Status: DISCONTINUED | OUTPATIENT
Start: 2022-06-20 | End: 2022-06-25 | Stop reason: HOSPADM

## 2022-06-20 RX ORDER — POTASSIUM CHLORIDE 7.45 MG/ML
10 INJECTION INTRAVENOUS
Status: COMPLETED | OUTPATIENT
Start: 2022-06-20 | End: 2022-06-21

## 2022-06-20 RX ORDER — POTASSIUM CHLORIDE 7.45 MG/ML
10 INJECTION INTRAVENOUS ONCE
Status: COMPLETED | OUTPATIENT
Start: 2022-06-20 | End: 2022-06-20

## 2022-06-20 RX ORDER — HALOPERIDOL 5 MG/ML
2.5-5 INJECTION INTRAMUSCULAR EVERY 6 HOURS PRN
Status: DISCONTINUED | OUTPATIENT
Start: 2022-06-20 | End: 2022-06-20

## 2022-06-20 RX ORDER — FOLIC ACID 1 MG/1
1 TABLET ORAL DAILY
Status: DISCONTINUED | OUTPATIENT
Start: 2022-06-21 | End: 2022-06-25 | Stop reason: HOSPADM

## 2022-06-20 RX ORDER — ONDANSETRON 2 MG/ML
4 INJECTION INTRAMUSCULAR; INTRAVENOUS ONCE
Status: COMPLETED | OUTPATIENT
Start: 2022-06-20 | End: 2022-06-20

## 2022-06-20 RX ORDER — MULTIPLE VITAMINS W/ MINERALS TAB 9MG-400MCG
1 TAB ORAL DAILY
Status: DISCONTINUED | OUTPATIENT
Start: 2022-06-21 | End: 2022-06-25 | Stop reason: HOSPADM

## 2022-06-20 RX ORDER — FLUMAZENIL 0.1 MG/ML
0.2 INJECTION, SOLUTION INTRAVENOUS
Status: DISCONTINUED | OUTPATIENT
Start: 2022-06-20 | End: 2022-06-20

## 2022-06-20 RX ORDER — LIDOCAINE 40 MG/G
CREAM TOPICAL
Status: DISCONTINUED | OUTPATIENT
Start: 2022-06-20 | End: 2022-06-25 | Stop reason: HOSPADM

## 2022-06-20 RX ORDER — DIAZEPAM 5 MG
10 TABLET ORAL EVERY 30 MIN PRN
Status: DISCONTINUED | OUTPATIENT
Start: 2022-06-20 | End: 2022-06-20

## 2022-06-20 RX ORDER — MULTIPLE VITAMINS W/ MINERALS TAB 9MG-400MCG
1 TAB ORAL DAILY
Status: DISCONTINUED | OUTPATIENT
Start: 2022-06-20 | End: 2022-06-20

## 2022-06-20 RX ORDER — DIAZEPAM 5 MG
10 TABLET ORAL EVERY 30 MIN PRN
Status: DISCONTINUED | OUTPATIENT
Start: 2022-06-20 | End: 2022-06-25 | Stop reason: HOSPADM

## 2022-06-20 RX ORDER — DIAZEPAM 10 MG/2ML
5-10 INJECTION, SOLUTION INTRAMUSCULAR; INTRAVENOUS EVERY 30 MIN PRN
Status: DISCONTINUED | OUTPATIENT
Start: 2022-06-20 | End: 2022-06-25 | Stop reason: HOSPADM

## 2022-06-20 RX ORDER — CLONIDINE HYDROCHLORIDE 0.1 MG/1
0.1 TABLET ORAL EVERY 8 HOURS
Status: DISCONTINUED | OUTPATIENT
Start: 2022-06-20 | End: 2022-06-20

## 2022-06-20 RX ORDER — FOLIC ACID 1 MG/1
1 TABLET ORAL DAILY
Status: DISCONTINUED | OUTPATIENT
Start: 2022-06-20 | End: 2022-06-20

## 2022-06-20 RX ORDER — CLONIDINE HYDROCHLORIDE 0.1 MG/1
0.1 TABLET ORAL EVERY 8 HOURS
Status: DISCONTINUED | OUTPATIENT
Start: 2022-06-20 | End: 2022-06-25 | Stop reason: HOSPADM

## 2022-06-20 RX ORDER — DIAZEPAM 10 MG/2ML
5-10 INJECTION, SOLUTION INTRAMUSCULAR; INTRAVENOUS EVERY 30 MIN PRN
Status: DISCONTINUED | OUTPATIENT
Start: 2022-06-20 | End: 2022-06-20

## 2022-06-20 RX ORDER — MAGNESIUM SULFATE HEPTAHYDRATE 40 MG/ML
2 INJECTION, SOLUTION INTRAVENOUS ONCE
Status: COMPLETED | OUTPATIENT
Start: 2022-06-20 | End: 2022-06-20

## 2022-06-20 RX ORDER — ONDANSETRON 4 MG/1
1 TABLET, ORALLY DISINTEGRATING ORAL PRN
COMMUNITY
Start: 2022-05-27

## 2022-06-20 RX ORDER — OLANZAPINE 5 MG/1
5-10 TABLET, ORALLY DISINTEGRATING ORAL EVERY 6 HOURS PRN
Status: DISCONTINUED | OUTPATIENT
Start: 2022-06-20 | End: 2022-06-20

## 2022-06-20 RX ADMIN — DIAZEPAM 10 MG: 5 INJECTION, SOLUTION INTRAMUSCULAR; INTRAVENOUS at 15:46

## 2022-06-20 RX ADMIN — Medication 5 MG: at 21:58

## 2022-06-20 RX ADMIN — POTASSIUM CHLORIDE 10 MEQ: 7.46 INJECTION, SOLUTION INTRAVENOUS at 22:41

## 2022-06-20 RX ADMIN — SODIUM CHLORIDE 1000 ML: 9 INJECTION, SOLUTION INTRAVENOUS at 15:32

## 2022-06-20 RX ADMIN — FOLIC ACID: 5 INJECTION, SOLUTION INTRAMUSCULAR; INTRAVENOUS; SUBCUTANEOUS at 16:16

## 2022-06-20 RX ADMIN — MAGNESIUM SULFATE HEPTAHYDRATE 2 G: 40 INJECTION, SOLUTION INTRAVENOUS at 16:15

## 2022-06-20 RX ADMIN — ONDANSETRON 4 MG: 2 INJECTION INTRAMUSCULAR; INTRAVENOUS at 15:32

## 2022-06-20 RX ADMIN — CLONIDINE HYDROCHLORIDE 0.1 MG: 0.1 TABLET ORAL at 23:39

## 2022-06-20 RX ADMIN — POTASSIUM CHLORIDE 10 MEQ: 7.45 INJECTION INTRAVENOUS at 17:25

## 2022-06-20 RX ADMIN — CLONIDINE HYDROCHLORIDE 0.1 MG: 0.1 TABLET ORAL at 15:47

## 2022-06-20 RX ADMIN — POTASSIUM CHLORIDE 10 MEQ: 7.45 INJECTION INTRAVENOUS at 16:13

## 2022-06-20 ASSESSMENT — ACTIVITIES OF DAILY LIVING (ADL)
CONCENTRATING,_REMEMBERING_OR_MAKING_DECISIONS_DIFFICULTY: YES
WEAR_GLASSES_OR_BLIND: NO
EATING: 0-->INDEPENDENT
SWALLOWING: 0-->SWALLOWS FOODS/LIQUIDS WITHOUT DIFFICULTY (DEVELOPMENTALLY APPROPRIATE)
WALKING_OR_CLIMBING_STAIRS_DIFFICULTY: YES
EATING/SWALLOWING: EATING
DRESSING/BATHING_DIFFICULTY: NO
CHANGE_IN_FUNCTIONAL_STATUS_SINCE_ONSET_OF_CURRENT_ILLNESS/INJURY: YES
ADLS_ACUITY_SCORE: 37
EATING: 0-->INDEPENDENT
FALL_HISTORY_WITHIN_LAST_SIX_MONTHS: NO
ADLS_ACUITY_SCORE: 35
TOILETING_ISSUES: NO
DOING_ERRANDS_INDEPENDENTLY_DIFFICULTY: NO
TRANSFERRING: 0-->INDEPENDENT
SWALLOWING: 0-->SWALLOWS FOODS/LIQUIDS WITHOUT DIFFICULTY
TRANSFERRING: 0-->INDEPENDENT
WALKING_OR_CLIMBING_STAIRS: STAIR CLIMBING DIFFICULTY, REQUIRES EQUIPMENT
ADLS_ACUITY_SCORE: 23
DIFFICULTY_EATING/SWALLOWING: YES

## 2022-06-20 ASSESSMENT — ENCOUNTER SYMPTOMS
ABDOMINAL PAIN: 1
VOMITING: 1
SHORTNESS OF BREATH: 0
ACTIVITY CHANGE: 1
SORE THROAT: 0
CONSTIPATION: 1
NAUSEA: 1
COUGH: 1
APPETITE CHANGE: 1
UNEXPECTED WEIGHT CHANGE: 1
RHINORRHEA: 0
DYSPHORIC MOOD: 1

## 2022-06-20 NOTE — ED PROVIDER NOTES
[place] [facility] ([facility])  June 20, 2022 ED 9 3:15 PM   History     Chief Complaint   Patient presents with     Nausea, Vomiting, & Diarrhea     The history is provided by the patient and medical records.     Jesus Martinez is a 37 year old male with history of alcohol and tobacco use disorder who presents with abdominal pain recurrent nausea and vomiting.  Mother states that he has been sick for the past 3 months with tremors, recurrent nausea and vomiting. He has been having difficulty tolerating oral hydration and intake with this.  Mother states that he has been vomiting 6-10 times a day for the past 2-3 months. The vomiting starts as soon as he wakes up in the mornings and rolls over in bed, has dry heaves all day.  Feels that he is mostly dry heaving and not really getting much up as he is not getting very much down.  He states that he cannot even tolerate oral Zofran ODT.  He has occasional hematemesis with forceful vomiting. He wants to eat but cannot due to the recurrent nausea and vomiting. Mother states that he has been seen in the emergency department 4 times for these episodes of nausea and vomiting.  Patient has been using marijuana once or twice daily to keep appetite up. Doesn't know how much weight he has lost from all the nausea and vomiting. Has abdominal pain in lower abdomen.  He used to have diarrhea 3-4 times a day but lately has been having constipation.  He states that the other day he spent 30 minutes sitting on the toilet but only passed a few small cameron of stool.     Mother states that he works as a  and had returned to work for 1 week after a prior hospitalization in March and since then has been too ill to return to work, has been homebound since March of this year by his symptoms.  She states that he has been living with her ever since COVID quarantine and she does not allow alcohol in the house, states that he will be kicked out of the house if  he does drink and therefore has not drank in months other than a small bottle of fireball whiskey which he reported to his mother as the only thing helping his nausea, vomiting and oral sores.  Mother is adamant that there is no alcohol in the house.  Patient himself states that he has been drinking a couple times a week, last drink was yesterday afternoon consisting of 1/5th of whiskey.  He states that he has been slowly cutting back and his alcohol use is decreased compared to previous.  He states that he is not drinking on a daily basis.  No history of alcohol withdrawal seizures. Patient himself denies alcohol withdrawal DTs or auditory/visual hallucinations.  Mother states that she she has seen him hallucinate, that he reports seeing ghosts in the house and things moving on their own, like a book falling off a bookcase without anyone near it.  Mother states that he has been reporting seeing ghosts at least 10 times since March. Mother states that he sounds bizarre at times, sometimes does not sound like himself.     Mother states that he that he had bad experiences with this as soon as alcohol was mentioned at other hospitals, and therefore brought him to a different healthcare system for further evaluation. Mother hopes he will be treated better here.  She states that she is here to help advocate for him.  He can't eat or drink, mother is concerned for dehydration and malnutrition.  She notes that she was diagnosed with alcohol-related neuropathy, heart damage and kidney damage as well as gastritis.     In addition patient has mouth sores and sore throat that seems independent of his nausea or vomiting.  He notes that his wisdom teeth have come in and does not know if this is related or not.  Mother is concerned that this is an infection that other providers have simply thrown Magic mouthwash at instead of treating with formal antibiotics.  She notes that he has been seen twice in the emergency department in  past for these tongue lesions, at one point they thought it was from drug reaction. Didn't follow-up with Oral Surgery for this, went to ER again prescribed some other mouthrinse after receiving a different diagnosis.  He states that these oral sores have been ongoing since March of this year.  He endorses coughing with posttussive emesis.    Patient reports a history of depression for which he was started on Zoloft a couple months ago.  Mother notes that he has difficulty managing cares at home, that it had been been over a month since he showered and changed in his clothes.  She called an ambulance for him today based on his symptoms and that he refused to board the ambulance until after he showered. He denies suicidal or homicidal ideation, but mother notes she has heard him express suicidal ideation at home.     Denies chronic medical issues. Last year started going to hospital for alcohol abuse for kidney/heart issues, dehydration and elevated heart rate. No history of abdominal surgery. No chest pain, palpitations.  No fevers. No suicidal or homicidal ideation.    Patient is followed by Naida Cedeno NP at [facility] for Primary care.     PAST MEDICAL HISTORY: No past medical history on file.    PAST SURGICAL HISTORY: No past surgical history on file.    Past medical history, past surgical history, medications, and allergies were reviewed with the patient. Additional pertinent items: None    FAMILY HISTORY: No family history on file.    SOCIAL HISTORY:   Social History     Tobacco Use     Smoking status: Current Every Day Smoker     Types: Cigarettes     Smokeless tobacco: Never Used     Tobacco comment: 8 cigs per day   Substance Use Topics     Alcohol use: Yes     Comment: 3-4 drinks per day     Social history was reviewed with the patient. Additional pertinent items: None      Patient's Medications   New Prescriptions    No medications on file   Previous Medications    DOXYLAMINE (UNISOM) 25 MG  TABS TABLET    Take 2 tablets (50 mg) by mouth nightly as needed for sleep    HYDROXYZINE (VISTARIL) 50 MG CAPSULE    Take 1 capsule (50 mg) by mouth 4 times daily as needed for anxiety    MAGIC MOUTHWASH SUSPENSION (DIPHENHYDRAMINE, LIDOCAINE, ALUMINUM-MAGNESIUM & SIMETHICONE)    Swish and spit 10 mLs in mouth every 6 hours as needed for mouth sores or mild pain    METOPROLOL SUCCINATE ER (TOPROL-XL) 25 MG 24 HR TABLET    Take 1 tablet (25 mg) by mouth daily    MULTIVITAMIN (ONE-DAILY) TABLET    Take 1 tablet by mouth daily    PANTOPRAZOLE (PROTONIX) 40 MG EC TABLET    Take 1 tablet (40 mg) by mouth 2 times daily (before meals)    SERTRALINE (ZOLOFT) 100 MG TABLET    Take 1 tablet (100 mg) by mouth daily    SM NICOTINE 14 MG/24HR 24 HR PATCH        THIAMINE (B-1) 100 MG TABLET    Take 1 tablet (100 mg) by mouth daily    VITAMIN B-12 (CYANOCOBALAMIN) 1000 MCG TABLET    Take 1 tablet (1,000 mcg) by mouth daily   Modified Medications    No medications on file   Discontinued Medications    No medications on file          Allergies   Allergen Reactions     Wellbutrin [Bupropion]      Suicidal thoughts        Review of Systems   Constitutional: Positive for activity change, appetite change (poor oral intake) and unexpected weight change.   HENT: Negative for rhinorrhea and sore throat.    Respiratory: Positive for cough. Negative for shortness of breath.    Cardiovascular: Negative for chest pain.   Gastrointestinal: Positive for abdominal pain, constipation, nausea and vomiting.   Psychiatric/Behavioral: Positive for dysphoric mood (difficulty maintaining personal hygiene). Hallucinations: patient denies having any hallucinations, mother disagrees. Suicidal ideas: patient denies suicidal ideas, mother disagrees.   All other systems reviewed and are negative.    A complete review of systems was performed with pertinent positives and negatives noted in the HPI, and all other systems negative.    Physical Exam   BP: (!)  "161/104  Pulse: (!) 144  Temp: 98.1  F (36.7  C)  Resp: 18  Height: 175.3 cm (5' 9\")  Weight: 49.9 kg (110 lb)  SpO2: 99 %      Physical Exam  Vitals and nursing note reviewed.   Constitutional:       General: He is in acute distress.      Appearance: He is underweight. He is ill-appearing.      Comments: Cachectic adult male, sitting on side of bed, retching/vomiting into a bedside garbage can; tremulous, obvious distress   HENT:      Head: Normocephalic.   Eyes:      Pupils: Pupils are equal, round, and reactive to light.   Cardiovascular:      Rate and Rhythm: Regular rhythm. Tachycardia present.      Heart sounds: Normal heart sounds. No murmur heard.    No gallop.   Pulmonary:      Effort: Pulmonary effort is normal. No respiratory distress.      Breath sounds: Normal breath sounds. No wheezing or rales.   Abdominal:      General: There is no distension.      Palpations: Abdomen is soft.      Tenderness: There is abdominal tenderness. There is no guarding or rebound.      Comments: Soft, flat, somewhat diffuse TTP globally with mild guarding, hypoactive bowel sounds   Skin:     General: Skin is warm and dry.   Neurological:      Mental Status: He is alert and oriented to person, place, and time.      Comments: Obvious tongue fasiculations, tremors of all extremities   Psychiatric:      Comments: Anxious, distressed.  Cooperative, not agitated or aggressive. No SI.          ED Course        Procedures            EKG Interpretation:      Interpreted by Latrice Tan MD  Time reviewed: 1610  Symptoms at time of EKG: None   Rhythm: normal sinus   Rate: Normal  Axis: Normal  Ectopy: none  Conduction: prolonged QTc at 600 ms  ST Segments/ T Waves: T wave inversion aVR, V1  Q Waves: none  Comparison to prior: No old EKG available    Clinical Impression: prolonged QTc    The medical record was reviewed and interpreted.  Current labs reviewed and interpreted.  Previous labs reviewed and interpreted.       Critical " care: Based upon patient's evaluation, he is critically ill and does require critical care.  Approximately 75 minutes is spent in assessment, reassessment, record review, discussion with patient, family, admitting staff, entering and interpretation of orders, and documentation             Results for orders placed or performed during the hospital encounter of 06/20/22 (from the past 24 hour(s))   CBC with Platelets & Differential    Narrative    The following orders were created for panel order CBC with Platelets & Differential.  Procedure                               Abnormality         Status                     ---------                               -----------         ------                     CBC with platelets and d...[[id number]]  Abnormal            Final result                 Please view results for these tests on the individual orders.   Comprehensive metabolic panel   Result Value Ref Range    Sodium 136 133 - 144 mmol/L    Potassium 2.9 (L) 3.4 - 5.3 mmol/L    Chloride 97 94 - 109 mmol/L    Carbon Dioxide (CO2) 21 20 - 32 mmol/L    Anion Gap 18 (H) 3 - 14 mmol/L    Urea Nitrogen 4 (L) 7 - 30 mg/dL    Creatinine 0.94 0.66 - 1.25 mg/dL    Calcium 9.8 8.5 - 10.1 mg/dL    Glucose 131 (H) 70 - 99 mg/dL    Alkaline Phosphatase 260 (H) 40 - 150 U/L     (H) 0 - 45 U/L     (H) 0 - 70 U/L    Protein Total 8.7 6.8 - 8.8 g/dL    Albumin 4.2 3.4 - 5.0 g/dL    Bilirubin Total 4.8 (H) 0.2 - 1.3 mg/dL    GFR Estimate >90 >60 mL/min/1.73m2   INR   Result Value Ref Range    INR 0.98 0.85 - 1.15   Alcohol   Result Value Ref Range    Alcohol ethyl <0.01 <=0.01 g/dL   Ketone Beta-Hydroxybutyrate Quantitative,Rapid   Result Value Ref Range    Ketone (Beta-Hydroxybutyrate) Quantitative 0.8 (H) 0.0 - 0.6 mmol/L   Lipase   Result Value Ref Range    Lipase 206 73 - 393 U/L   Magnesium   Result Value Ref Range    Magnesium 1.4 (L) 1.6 - 2.3 mg/dL   CBC with platelets and differential   Result Value Ref Range     WBC Count 16.0 (H) 4.0 - 11.0 10e3/uL    RBC Count 5.06 4.40 - 5.90 10e6/uL    Hemoglobin 17.4 13.3 - 17.7 g/dL    Hematocrit 47.8 40.0 - 53.0 %    MCV 95 78 - 100 fL    MCH 34.4 (H) 26.5 - 33.0 pg    MCHC 36.4 31.5 - 36.5 g/dL    RDW 12.0 10.0 - 15.0 %    Platelet Count 217 150 - 450 10e3/uL    % Neutrophils 83 %    % Lymphocytes 4 %    % Monocytes 12 %    % Eosinophils 0 %    % Basophils 1 %    % Immature Granulocytes 0 %    NRBCs per 100 WBC 0 <1 /100    Absolute Neutrophils 13.2 (H) 1.6 - 8.3 10e3/uL    Absolute Lymphocytes 0.7 (L) 0.8 - 5.3 10e3/uL    Absolute Monocytes 1.9 (H) 0.0 - 1.3 10e3/uL    Absolute Eosinophils 0.0 0.0 - 0.7 10e3/uL    Absolute Basophils 0.2 0.0 - 0.2 10e3/uL    Absolute Immature Granulocytes 0.1 <=0.4 10e3/uL    Absolute NRBCs 0.0 10e3/uL   Phosphorus   Result Value Ref Range    Phosphorus 2.4 (L) 2.5 - 4.5 mg/dL   EKG 12 lead   Result Value Ref Range    Systolic Blood Pressure  mmHg    Diastolic Blood Pressure  mmHg    Ventricular Rate 95 BPM    Atrial Rate 95 BPM    TX Interval 140 ms    QRS Duration 84 ms     ms    QTc 600 ms    P Axis 65 degrees    R AXIS 62 degrees    T Axis 55 degrees    Interpretation ECG       Sinus rhythm  Nonspecific ST abnormality  Prolonged QT  Abnormal ECG     Urine Drugs of Abuse Screen    Narrative    The following orders were created for panel order Urine Drugs of Abuse Screen.  Procedure                               Abnormality         Status                     ---------                               -----------         ------                     Drug abuse screen 1 urin...[[id number]]                      In process                   Please view results for these tests on the individual orders.   UA with Microscopic reflex to Culture    Specimen: Urine, Midstream   Result Value Ref Range    Color Urine Light Yellow Colorless, Straw, Light Yellow, Yellow    Appearance Urine Clear Clear    Glucose Urine Negative Negative mg/dL    Bilirubin Urine  Negative Negative    Ketones Urine Negative Negative mg/dL    Specific Gravity Urine 1.006 1.003 - 1.035    Blood Urine Negative Negative    pH Urine 7.5 (H) 5.0 - 7.0    Protein Albumin Urine Negative Negative mg/dL    Urobilinogen Urine Normal Normal, 2.0 mg/dL    Nitrite Urine Negative Negative    Leukocyte Esterase Urine Negative Negative    Mucus Urine Present (A) None Seen /LPF    RBC Urine 1 <=2 /HPF    WBC Urine 0 <=5 /HPF    Hyaline Casts Urine 38 (H) <=2 /LPF    Narrative    Urine Culture not indicated     Medications   cloNIDine (CATAPRES) tablet 0.1 mg (0.1 mg Oral Given 6/20/22 1547)   flumazenil (ROMAZICON) injection 0.2 mg (has no administration in time range)   melatonin tablet 5 mg (has no administration in time range)   diazepam (VALIUM) tablet 10 mg ( Oral See Alternative 6/20/22 1546)     Or   diazepam (VALIUM) injection 5-10 mg (10 mg Intravenous Given 6/20/22 1546)   thiamine (B-1) tablet 100 mg (has no administration in time range)   folic acid (FOLVITE) tablet 1 mg (has no administration in time range)   multivitamin w/minerals (THERA-VIT-M) tablet 1 tablet (has no administration in time range)   potassium chloride 10 mEq in 100 mL sterile water intermittent infusion (premix) (10 mEq Intravenous New Bag 6/20/22 1725)   lidocaine 1 % 0.1-1 mL (has no administration in time range)   lidocaine (LMX4) cream (has no administration in time range)   sodium chloride (PF) 0.9% PF flush 3 mL (has no administration in time range)   sodium chloride (PF) 0.9% PF flush 3 mL (has no administration in time range)   ondansetron (ZOFRAN) injection 4 mg (4 mg Intravenous Given 6/20/22 1532)   0.9% sodium chloride BOLUS (0 mLs Intravenous Stopped 6/20/22 1621)   sodium chloride 0.9 % 1,000 mL with Infuvite Adult 10 mL, thiamine 100 mg, folic acid 1 mg infusion ( Intravenous Stopped 6/20/22 1709)   magnesium sulfate 2 g in water intermittent infusion (0 g Intravenous Stopped 6/20/22 1708)   potassium chloride 10  mEq in 100 mL sterile water intermittent infusion (premix) (0 mEq Intravenous Stopped 6/20/22 1708)             Assessments & Plan (with Medical Decision Making)   Patient presents to the emergency department today for the above complaints.  On my initial examination, he is tachycardic, tremulous, obviously severely distressed, vomiting into a bedside garbage can.  Strongly suspect acute severe alcohol withdrawal given tachycardia and hypertension as well as his history of alcohol abuse.    We did establish IV access and we did draw blood for laboratory analysis.  CBC shows a leukocytosis with a white count of 16, hemoglobin 17.4.  However, strongly suspect a component of hemoconcentration given the fact that most recent comparison hemoglobin is 14.4 from 1 month ago.  CBC shows hypokalemia with a potassium of 2.9, anion gap is up at 18.  Creatinine is 0.94, alkaline phosphatase is 260, , , bilirubin 4.8.  INR is within normal limits, serum alcohol level is 0.  Serum ketones elevated at 0.8.  Lipase is normal and magnesium is low at 1.4.  EKG shows normal sinus rhythm with a rate of 95, however QTC is greatly prolonged at 600.  Likely due to metabolic disturbances which we are currently treating.    Initial CIWA was in the 20s.  I have ordered a UA and a urine tox screen, but I did believe the patient required immediate treatment with IV benzodiazepines for what is obviously florid alcohol withdrawal.  CIWA protocol was ordered, (I have discontinued the Haldol and Zyprexa which are part of the CIWA protocol as they are QT prolonging agents).  He also received IV fluids, IV vitamins and valium 10 mg IV.      Upon reassessment, patient does seem to appear much improved, blood pressure is coming down and heart rate is also down.  He is not nearly as tremulous as before.  We will continue to treat with oral Valium as needed.  I have ordered an abdominal ultrasound given his LFT elevation; US pending at  "this time.  Meld-Na score today is 13.  We will admit under the medicine service at this time for acute alcohol withdrawal, hypokalemia, hypomagnesemia.  I did have a discussion with the patient and his mother where I explained that almost all of these issues that he is having chronically is likely due to alcohol abuse and frequent episodes of alcohol withdrawal.  Patient's mother does not seem convinced that that is the only problem.  She has also told the patient that as he has been \"sneaking alcohol\" behind her back, he is not welcome to return home with her.    MELD-Na score: 13 at 6/20/2022  3:24 PM  MELD score: 12 at 6/20/2022  3:24 PM  Calculated from:  Serum Creatinine: 0.94 mg/dL (Using min of 1 mg/dL) at 6/20/2022  3:24 PM  Serum Sodium: 136 mmol/L at 6/20/2022  3:24 PM  Total Bilirubin: 4.8 mg/dL at 6/20/2022  3:24 PM  INR(ratio): 0.98 (Using min of 1) at 6/20/2022  3:24 PM  Age: 37 years      Discussed with hospitalist who is [place] [facility] [place].     I have reviewed the nursing notes.    I have reviewed the findings, diagnosis, plan and need for follow up with the patient.    New Prescriptions    No medications on file       Final diagnoses:   Alcohol withdrawal syndrome with perceptual disturbance (H)   Elevated liver function tests   Hypomagnesemia   Hypokalemia   Prolonged Q-T interval on ECG - 600 ms   I, Evelin Rice, am serving as a trained medical scribe to document services personally performed by Latrice Tan MD based on the provider's statements to me on June 20, 2022.  This document has been checked and approved by the attending provider.    ILatrice MD, was physically present and have reviewed and verified the accuracy of this note documented by Evelin Rice, medical scribe.      Latrice Tan MD         6/20/2022   [facility] EMERGENCY DEPARTMENT     Latrice Tan MD  06/20/22 1837    "

## 2022-06-20 NOTE — H&P
[facility]    History and Physical - Hospitalist Service, GOLD TEAM        Date of Admission:  6/20/2022    Assessment & Plan      Jesus Martinez is a 37 year old male with a history of alcohol use disorder, hypertension, anxiety and depression, who was admitted to [facility] on 6/20/2022 after presenting to the ED with nausea, vomiting, and abdominal pain and found to be in acute alcohol withdrawal.    Acute Alcohol Withdrawal  Alcohol Use Disorder  Patient reports drinking 4-5 drinks of whiskey daily prior to admission, with most recent drink more than 24 hours ago. Ethanol level of <0.01 on admission. Endorses ongoing abdominal pain, nausea, vomiting, all likely consistent with alcohol use. Also with some hallucinations at home noted by patient and mom. Notes 15 year history of excessive alcohol consumption, but denies history of withdrawal seizures and DT, as well as previous treatment for alcohol use. Currently not interested in alcohol treatment as outpatient due to work,   - Continue MSSA protocol  - Continue folic acid, multivitamin, and thiamine  - Consider chem dep consult after discussing situation with patient further    Nausea  Vomiting  Reports ongoing nausea and 6 episodes of mostly non-bloody vomiting for 4-5 months, and has been seen as outpatient and in ED. Has been consuming alcohol as above as well as using marijuana daily. Feel likely related to alcohol use, but marijuana use could also be contributing to a possible cannabinoid hyperemesis syndrome. Was taking Zofran as outpatient, but now with QTc prolongation noted below so on hold.  Kidney function within normal range, but UA did show hyaline casts-- consistent with ongoing vomiting. Nausea does seem to be well maintained with Valium.  - Encourage oral hydration  - Avoid QT prolonging agents    QTc Prolongation  EKG on admission with QTc of 600, which was taken after patient received Zofran, although  has been taking continuously as outpatient. Patient also on Zoloft and hydroxyzine as outpatient, which can also cause QT prolongation so question if multifactorial. Previous EKG done September 2021 with QTc of 422.    - Continue to monitor  - Cardiac monitoring  - Avoid QT prolonging agents    Hypokalemia  On admission, potassium of 2.9. Likely due to poor oral intake in the setting of excessive alcohol use. Received 20 mEq of IV replacement in the ED, but repeat did not show any improvement.  - Start RN managed potassium replacement protocol    Anion Gap Metabolic Acidosis  Anion gap of 18 noted on admission. Likely related to excessive alcohol use prior to admission.  - Follow BMP    Leukocytosis  On admission, leukocytosis to 16 with ANC of 13.2. Patient endorses nausea, vomiting, abdominal pain, but denies any other infectious symptoms such as fever, chills, urinary symptoms, cough, shortness of breath. On evaluation, patient non-toxic appearing. Mildly tachycardic and hypertensive, but otherwise afebrile and hemodynamically stable. Unlikely to be infectious etiology behind leukocytosis, likely consistent with alcohol use and ongoing vomiting.   - Follow CBC    Transaminitis  Hyperbilirubinemia  Hepatic Steatosis  On admission, AST, ALT elevated in 2:1 pattern. Alk phos and t bili also elevated. No thrombocytopenia and INR within normal range. Likely consistent with excessive alcohol use. US abdomen performed on admission showed hepatomegaly and hepatic steatosis without convincing evidence of cirrhosis. No indication of acute alcoholic hepatitis, but is likely steatosis is related to alcohol use.   - Repeat LFT in two days to ensure down trending  - Follow CBC  - Encourage alcohol cessation    Hypomagnesemia, improved  On admission, magnesium of 1.4. Likely due to poor oral intake in the setting of excessive alcohol use. Received 2 grams of  IV replacement in the ED. Repeat showed improvement and actually  slightly elevated.   - Recheck magnesium in AM    Elevated Blood Pressure  Tachycardia  History of hypertension  Blood pressure elevated to 150's systolic and heart rate to 110 on admission. Likely consistent with alcohol use. ECG with NSR, but did not QTc prolongation as noted above. Does have history of hypertension, but has not been taking PTA metoprolol for about one week.   - Continue clonidine PRN   - Hold PTA metoprolol for now  - Encourage alcohol cessation    Hypophosphatemia, mild  On admission, phosphorus mildly decreased to 2.4. Likely due to poor oral intake. Will continue to monitor for now and encourage oral intake.  - Encourage oral intake  - Follow phosphorus    Depression, Anxiety Hold PTA on Zoloft and hydroxyzine PRN for now given current QTc prolongation.     Tobacco use disorder Nicotine patches PRN.     Diet: Combination Diet Regular Diet Adult  DVT Prophylaxis: Pneumatic Compression Devices  Wheatley Catheter: Not present  Central Lines: None  Cardiac Monitoring: ACTIVE order. Indication: QTc prolonging medication (48 hours)  Code Status: Full Code    Clinically Significant Risk Factors Present on Admission        # Hypokalemia: K = 2.9 mmol/L (Ref range: 3.4 - 5.3 mmol/L) on admission, will replace as needed     # Anion Gap Metabolic Acidosis: AG = 18 mmol/L (Ref range: 3 - 14 mmol/L) on admission, will monitor and treat as appropriate           Disposition Plan   Expected Discharge:  TBD   Anticipated discharge location:  Awaiting care coordination huddle  Delays:   Resolution of acute alcohol withdrawal        The patient's care was discussed with the Attending Physician, Dr. Phoenix and Patient.    Priscilla Driver PA-C  Hospitalist Service, [facility]  Securely message with the Vocera Web Console (learn more here)  Text page via apomio Paging/Directory  "        ______________________________________________________________________    Chief Complaint   \"Not feeling very well\"    History is obtained from the patient and the patient's chart    History of Present Illness   Jesus Martinez is a 37 year old male with a history of alcohol use disorder, who was admitted to [facility] on 6/20/2022 after presenting to the ED in acute alcohol withdrawal.    Patient reports ongoing nausea, vomiting, abdominal pain, and mouth pain for about 4-5 months. Reports he has been following with his outpatient provider and has also presented to the hospital multiple times for evaluation. Noted he has not been able to eat anything for the last 10 days without vomiting, which he says occurs about 6 times daily. Says he has been drinking 4-5 drinks of whiskey daily or every other day, but states this is overall decreased from his usual as he has been drinking heavily for >15 years. Does not feel he is able to receive ongoing alcohol dependency treatment at this time. Otherwise denies chest pain, shortness of breath, cough, fever, chills, difficulty with urination, difficulty with bowel movements, confusion. Please see extensive ED HPI for further information regarding presentation.     Review of Systems    The 10 point Review of Systems is negative other than noted in the HPI or here.     Past Medical History    I have reviewed this patient's medical history and updated it with pertinent information if needed.   Past Medical History:   Diagnosis Date     Anxiety      Benign essential hypertension      Depression        Past Surgical History   I have reviewed this patient's surgical history and updated it with pertinent information if needed.  History reviewed. No pertinent surgical history.    Social History   I have reviewed this patient's social history and updated it with pertinent information if needed.  Social History     Tobacco Use     Smoking status: Current Every Day Smoker     Types: " Cigarettes     Smokeless tobacco: Never Used     Tobacco comment: 8 cigs per day   Substance Use Topics     Alcohol use: Yes     Comment: 3-4 drinks per day     Drug use: Never       Family History   I have reviewed this patient's family history and updated it with pertinent information if needed.  Family History   Problem Relation Age of Onset     Alcoholism Father      Prior to Admission Medications   Prior to Admission Medications   Prescriptions Last Dose Informant Patient Reported? Taking?   SM NICOTINE 14 MG/24HR 24 hr patch Past Month at 3 weeks ago Self Yes Yes   diphenhydrAMINE-acetaminophen (TYLENOL PM)  MG tablet 6/19/2022 at pm #4 tablets Self Yes Yes   Sig: Take 1 tablet by mouth nightly as needed for sleep   doxylamine (UNISOM) 25 MG TABS tablet Past Week at week ago Self No Yes   Sig: Take 2 tablets (50 mg) by mouth nightly as needed for sleep   hydrOXYzine (VISTARIL) 50 MG capsule 6/19/2022 at afternoon Self No Yes   Sig: Take 1 capsule (50 mg) by mouth 4 times daily as needed for anxiety   magic mouthwash suspension (diphenhydrAMINE, lidocaine, aluminum-magnesium & simethicone) Past Week at a few days ago Self No Yes   Sig: Swish and spit 10 mLs in mouth every 6 hours as needed for mouth sores or mild pain   metoprolol succinate ER (TOPROL-XL) 25 MG 24 hr tablet Past Month at 1-2 weeks Self No Yes   Sig: Take 1 tablet (25 mg) by mouth daily   multivitamin (ONE-DAILY) tablet Past Week at about 3 days ago Self No Yes   Sig: Take 1 tablet by mouth daily   ondansetron (ZOFRAN ODT) 4 MG ODT tab Past Week at a few days ago Self Yes Yes   Sig: Take 1 tablet by mouth as needed   pantoprazole (PROTONIX) 40 MG EC tablet Past Week at week ago Self No Yes   Sig: Take 1 tablet (40 mg) by mouth 2 times daily (before meals)   sertraline (ZOLOFT) 100 MG tablet 6/19/2022 at am Self No Yes   Sig: Take 1 tablet (100 mg) by mouth daily   thiamine (B-1) 100 MG tablet 6/19/2022 at am Self No Yes   Sig: Take 1  tablet (100 mg) by mouth daily   vitamin B-12 (CYANOCOBALAMIN) 1000 MCG tablet 6/19/2022 at am Self No Yes   Sig: Take 1 tablet (1,000 mcg) by mouth daily      Facility-Administered Medications: None     Allergies   Allergies   Allergen Reactions     Wellbutrin [Bupropion]      Suicidal thoughts       Physical Exam   Vital Signs: Temp: 98.3  F (36.8  C) Temp src: Oral BP: (!) 130/98 Pulse: 91   Resp: 16 SpO2: 100 % O2 Device: None (Room air)    Weight: 110 lbs 0 oz    General Appearance: Mildly cachetic, but overall non-toxic appearing male laying in bed on his right side.  Eyes: PERRLA. No conjunctival icterus.  HEENT: Atraumatic. Teeth with yellowing at gums, otherwise no obvious infection or lesions noted.  Respiratory: Clear to auscultation throughout. No wheezes, rhonchi or rales.  Cardiovascular: RRR. No murmurs, rubs or gallops. No lower extremity edema.   GI: Bowel sounds present throughout. Abdomen tender to light palpation throughout. No distension noted.  Lymph/Hematologic: No bruising noted on exposed skin.   Skin: No jaundice noted.  Musculoskeletal: Moving all extremities spontaneously.   Neurologic: CN II-XII grossly intact. No tremors noted.  Psychiatric: Very limited insight to drinking and overall medical situation. Slightly irritable and defensive.     Data   Data reviewed today: I reviewed all medications, new labs and imaging results over the last 24 hours. I personally reviewed EKG.    EKG 6/20/2022  Sinus rhythm   Nonspecific ST abnormality   Prolonged QT (QTc of 600)    US Abdomen Pelvis 6/20/2022  Hepatomegaly and hepatic steatosis. No convincing morphologic evidence of cirrhosis.    Recent Labs   Lab 06/20/22  1940 06/20/22  1524   NA  --  136   POTASSIUM 2.9* 2.9*   CHLORIDE  --  97   CO2  --  21   ANIONGAP  --  18*   BUN  --  4*   CR  --  0.94   WENDY  --  9.8   MAG 2.4* 1.4*   PHOS  --  2.4*   PROTTOTAL  --  8.7   ALBUMIN  --  4.2   BILITOTAL  --  4.8*   ALKPHOS  --  260*   AST  --  294*    ALT  --  128*     Recent Labs   Lab 06/20/22  1524   WBC 16.0*   RBC 5.06   HGB 17.4   HCT 47.8   MCV 95   MCH 34.4*   MCHC 36.4   RDW 12.0        Recent Labs   Lab 06/20/22  1524   INR 0.98     No lab results found in last 7 days.   No lab results found in last 7 days.  No lab results found in last 7 days.  Recent Labs   Lab 06/20/22  1524   *        All labs personally reviewed in [facility].  See A&P for additional results.     Unresulted Labs Ordered in the Past 30 Days of this Admission     No orders found from 5/21/2022 to 6/21/2022.

## 2022-06-20 NOTE — ED TRIAGE NOTES
Mid abdominal pain  Sore throat  N/v/d  Started 3/18 from a mouth infection  Concern for malnutrition  Hasn't taken any meds today  last ETOH use yesterday afternoon

## 2022-06-21 LAB
ALBUMIN SERPL-MCNC: 2.7 G/DL (ref 3.4–5)
ALP SERPL-CCNC: 174 U/L (ref 40–150)
ALT SERPL W P-5'-P-CCNC: 88 U/L (ref 0–70)
ANION GAP SERPL CALCULATED.3IONS-SCNC: 7 MMOL/L (ref 3–14)
AST SERPL W P-5'-P-CCNC: 222 U/L (ref 0–45)
ATRIAL RATE - MUSE: 95 BPM
BILIRUB SERPL-MCNC: 4 MG/DL (ref 0.2–1.3)
BUN SERPL-MCNC: 5 MG/DL (ref 7–30)
CALCIUM SERPL-MCNC: 7.5 MG/DL (ref 8.5–10.1)
CHLORIDE BLD-SCNC: 105 MMOL/L (ref 94–109)
CO2 SERPL-SCNC: 26 MMOL/L (ref 20–32)
CREAT SERPL-MCNC: 0.6 MG/DL (ref 0.66–1.25)
DIASTOLIC BLOOD PRESSURE - MUSE: NORMAL MMHG
GFR SERPL CREATININE-BSD FRML MDRD: >90 ML/MIN/1.73M2
GLUCOSE BLD-MCNC: 86 MG/DL (ref 70–99)
HOLD SPECIMEN: NORMAL
INTERPRETATION ECG - MUSE: NORMAL
MAGNESIUM SERPL-MCNC: 2.3 MG/DL (ref 1.6–2.3)
P AXIS - MUSE: 65 DEGREES
PHOSPHATE SERPL-MCNC: 1.8 MG/DL (ref 2.5–4.5)
PHOSPHATE SERPL-MCNC: 2 MG/DL (ref 2.5–4.5)
POTASSIUM BLD-SCNC: 3.1 MMOL/L (ref 3.4–5.3)
POTASSIUM BLD-SCNC: 3.1 MMOL/L (ref 3.4–5.3)
POTASSIUM SERPL-SCNC: 3.7 MMOL/L (ref 3.4–4.5)
PR INTERVAL - MUSE: 140 MS
PROT SERPL-MCNC: 5.6 G/DL (ref 6.8–8.8)
QRS DURATION - MUSE: 84 MS
QT - MUSE: 478 MS
QTC - MUSE: 600 MS
R AXIS - MUSE: 62 DEGREES
SODIUM SERPL-SCNC: 138 MMOL/L (ref 133–144)
SYSTOLIC BLOOD PRESSURE - MUSE: NORMAL MMHG
T AXIS - MUSE: 55 DEGREES
VENTRICULAR RATE- MUSE: 95 BPM

## 2022-06-21 PROCEDURE — 84132 ASSAY OF SERUM POTASSIUM: CPT | Performed by: INTERNAL MEDICINE

## 2022-06-21 PROCEDURE — 36415 COLL VENOUS BLD VENIPUNCTURE: CPT | Performed by: STUDENT IN AN ORGANIZED HEALTH CARE EDUCATION/TRAINING PROGRAM

## 2022-06-21 PROCEDURE — 80321 ALCOHOLS BIOMARKERS 1OR 2: CPT | Performed by: INTERNAL MEDICINE

## 2022-06-21 PROCEDURE — 258N000003 HC RX IP 258 OP 636: Performed by: STUDENT IN AN ORGANIZED HEALTH CARE EDUCATION/TRAINING PROGRAM

## 2022-06-21 PROCEDURE — 83735 ASSAY OF MAGNESIUM: CPT

## 2022-06-21 PROCEDURE — 93005 ELECTROCARDIOGRAM TRACING: CPT

## 2022-06-21 PROCEDURE — 250N000011 HC RX IP 250 OP 636: Performed by: PHYSICIAN ASSISTANT

## 2022-06-21 PROCEDURE — 36415 COLL VENOUS BLD VENIPUNCTURE: CPT

## 2022-06-21 PROCEDURE — 250N000013 HC RX MED GY IP 250 OP 250 PS 637: Performed by: EMERGENCY MEDICINE

## 2022-06-21 PROCEDURE — 93010 ELECTROCARDIOGRAM REPORT: CPT | Performed by: INTERNAL MEDICINE

## 2022-06-21 PROCEDURE — 84100 ASSAY OF PHOSPHORUS: CPT | Performed by: STUDENT IN AN ORGANIZED HEALTH CARE EDUCATION/TRAINING PROGRAM

## 2022-06-21 PROCEDURE — 84100 ASSAY OF PHOSPHORUS: CPT

## 2022-06-21 PROCEDURE — 80053 COMPREHEN METABOLIC PANEL: CPT

## 2022-06-21 PROCEDURE — 120N000003 HC R&B IMCU UMMC

## 2022-06-21 PROCEDURE — 36415 COLL VENOUS BLD VENIPUNCTURE: CPT | Performed by: INTERNAL MEDICINE

## 2022-06-21 PROCEDURE — 99222 1ST HOSP IP/OBS MODERATE 55: CPT | Performed by: INTERNAL MEDICINE

## 2022-06-21 PROCEDURE — 250N000013 HC RX MED GY IP 250 OP 250 PS 637: Performed by: INTERNAL MEDICINE

## 2022-06-21 PROCEDURE — 250N000009 HC RX 250: Performed by: STUDENT IN AN ORGANIZED HEALTH CARE EDUCATION/TRAINING PROGRAM

## 2022-06-21 PROCEDURE — 250N000013 HC RX MED GY IP 250 OP 250 PS 637: Performed by: STUDENT IN AN ORGANIZED HEALTH CARE EDUCATION/TRAINING PROGRAM

## 2022-06-21 PROCEDURE — 258N000003 HC RX IP 258 OP 636: Performed by: INTERNAL MEDICINE

## 2022-06-21 PROCEDURE — 99233 SBSQ HOSP IP/OBS HIGH 50: CPT | Mod: 25 | Performed by: STUDENT IN AN ORGANIZED HEALTH CARE EDUCATION/TRAINING PROGRAM

## 2022-06-21 RX ORDER — NALTREXONE HYDROCHLORIDE 50 MG/1
50 TABLET, FILM COATED ORAL DAILY
Status: DISCONTINUED | OUTPATIENT
Start: 2022-06-22 | End: 2022-06-25 | Stop reason: HOSPADM

## 2022-06-21 RX ORDER — NICOTINE 21 MG/24HR
1 PATCH, TRANSDERMAL 24 HOURS TRANSDERMAL DAILY
Status: DISCONTINUED | OUTPATIENT
Start: 2022-06-21 | End: 2022-06-25 | Stop reason: HOSPADM

## 2022-06-21 RX ORDER — SODIUM CHLORIDE 9 MG/ML
INJECTION, SOLUTION INTRAVENOUS CONTINUOUS
Status: ACTIVE | OUTPATIENT
Start: 2022-06-21 | End: 2022-06-21

## 2022-06-21 RX ORDER — PANTOPRAZOLE SODIUM 40 MG/1
40 TABLET, DELAYED RELEASE ORAL
Status: DISCONTINUED | OUTPATIENT
Start: 2022-06-21 | End: 2022-06-25 | Stop reason: HOSPADM

## 2022-06-21 RX ORDER — ONDANSETRON 4 MG/1
4 TABLET, ORALLY DISINTEGRATING ORAL EVERY 6 HOURS PRN
Status: DISCONTINUED | OUTPATIENT
Start: 2022-06-21 | End: 2022-06-25 | Stop reason: HOSPADM

## 2022-06-21 RX ORDER — POTASSIUM CHLORIDE 1.5 G/1.58G
40 POWDER, FOR SOLUTION ORAL ONCE
Status: COMPLETED | OUTPATIENT
Start: 2022-06-21 | End: 2022-06-21

## 2022-06-21 RX ORDER — POTASSIUM CHLORIDE 1.5 G/1.58G
20 POWDER, FOR SOLUTION ORAL ONCE
Status: COMPLETED | OUTPATIENT
Start: 2022-06-21 | End: 2022-06-21

## 2022-06-21 RX ADMIN — NICOTINE 1 PATCH: 21 PATCH, EXTENDED RELEASE TRANSDERMAL at 11:37

## 2022-06-21 RX ADMIN — PANTOPRAZOLE SODIUM 40 MG: 40 TABLET, DELAYED RELEASE ORAL at 13:22

## 2022-06-21 RX ADMIN — CLONIDINE HYDROCHLORIDE 0.1 MG: 0.1 TABLET ORAL at 15:42

## 2022-06-21 RX ADMIN — Medication 1 TABLET: at 08:24

## 2022-06-21 RX ADMIN — THIAMINE HCL TAB 100 MG 100 MG: 100 TAB at 08:24

## 2022-06-21 RX ADMIN — POTASSIUM CHLORIDE 20 MEQ: 1.5 POWDER, FOR SOLUTION ORAL at 12:41

## 2022-06-21 RX ADMIN — POTASSIUM CHLORIDE 10 MEQ: 7.46 INJECTION, SOLUTION INTRAVENOUS at 01:01

## 2022-06-21 RX ADMIN — CLONIDINE HYDROCHLORIDE 0.1 MG: 0.1 TABLET ORAL at 08:24

## 2022-06-21 RX ADMIN — DIAZEPAM 10 MG: 5 TABLET ORAL at 19:20

## 2022-06-21 RX ADMIN — POTASSIUM CHLORIDE 10 MEQ: 7.46 INJECTION, SOLUTION INTRAVENOUS at 03:26

## 2022-06-21 RX ADMIN — DIAZEPAM 10 MG: 5 TABLET ORAL at 08:40

## 2022-06-21 RX ADMIN — POTASSIUM CHLORIDE 10 MEQ: 7.46 INJECTION, SOLUTION INTRAVENOUS at 02:10

## 2022-06-21 RX ADMIN — SODIUM PHOSPHATE, MONOBASIC, MONOHYDRATE AND SODIUM PHOSPHATE, DIBASIC, ANHYDROUS 15 MMOL: 276; 142 INJECTION, SOLUTION INTRAVENOUS at 13:58

## 2022-06-21 RX ADMIN — Medication 5 MG: at 21:34

## 2022-06-21 RX ADMIN — SODIUM CHLORIDE: 9 INJECTION, SOLUTION INTRAVENOUS at 00:45

## 2022-06-21 RX ADMIN — POTASSIUM CHLORIDE 40 MEQ: 1.5 POWDER, FOR SOLUTION ORAL at 08:24

## 2022-06-21 RX ADMIN — FOLIC ACID 1 MG: 1 TABLET ORAL at 08:24

## 2022-06-21 ASSESSMENT — ACTIVITIES OF DAILY LIVING (ADL)
ADLS_ACUITY_SCORE: 23
ADLS_ACUITY_SCORE: 25
ADLS_ACUITY_SCORE: 23
ADLS_ACUITY_SCORE: 25
ADLS_ACUITY_SCORE: 23

## 2022-06-21 NOTE — PLAN OF CARE
Time: 4912-6840    Shift events: Alert & oriented to person, place, time, and situation. Calls appropriately with call light. CIWA score 3 this shift, no PRNs needed. Mild tremors observed. Pt c/o mild anxiety. VSS. Room air, no cough, no SOB reported. SBA when ambulating to the bathroom. Pt having loose stools, BMx1 this shift. Denies abdominal pain. Pt c/o no appetite. Intermittent nausea, worse with PO intake. No emesis this shift. Potassium replaced this shift, recheck in the morning with morning labs. Notify primary team with changes.    Please see flowsheets for vital signs and assessments.

## 2022-06-21 NOTE — PROVIDER NOTIFICATION
"Time of notification: 12:12 AM  Provider notified: Dr. Cheng Mcclendon  Patient status: \"pt has IV potassium, not tolerating 50 or 100cc/hour infusion rate. Also not tolerating NS TKO line Y-ed into potassium infusion. Pt okay with 50cc/hr NS with 50cc/hour K rate. Can I have order for NS?\"   Temp:  [98.1  F (36.7  C)-98.8  F (37.1  C)] 98.8  F (37.1  C)  Pulse:  [] 84  Resp:  [12-26] 16  BP: (129-173)/() 132/96  SpO2:  [96 %-100 %] 100 %  Orders received: Short term MIVF ordered until potassium infusion complete.       "

## 2022-06-21 NOTE — PROGRESS NOTES
Admission          6/20/2022  3:05 PM  -----------------------------------------------------------  Reason for admission:  Primary team notified of pt arrival.  Admitted from: ED  Via: wheelchair  Accompanied by: none  Belongings: Charted, placed in closet; pt declined to lock up valuables.   Admission Profile: complete  Teaching: orientation to unit and call light- call light within reach, call don't fall, use of console, meal times, when to call for the RN, and enforced importance of safety   Access: PIV  Telemetry:Placed on pt  Ht./Wt.: complete  Code Status verified on armband: yes  2 RN Skin Assessment Completed By: Esther Hopkins Rec completed: yes  Bed surface reassessed with algorithm and charted: yes  New bed surface ordered: no  Suction/Ambu bag/Flowmeter at bedside: yes  Is patient having diarrhea upon admission- if YES fill out testing algorithm : yes    C. Diff Testing Algorithm (MUST be marked YES)   1. 3 or more loose stools in 24 hrs. [x] Yes [] No       Additional symptoms:(At least ONE must be marked yes)   1. Abdominal pain/discomfort [] Yes [x] No   2. Fever at least 38C (100.4 F) [] Yes [x] No   3. Elevated WBC(>11,000) [] Yes [x] No       Exclusion Criteria:  (MUST be marked YES)   1. Off laxatives for at least 48 hrs. [x] Yes [] No       Pt status:    Temp:  [98.1  F (36.7  C)-98.8  F (37.1  C)] 98.8  F (37.1  C)  Pulse:  [] 84  Resp:  [12-26] 16  BP: (129-173)/() 132/96  SpO2:  [96 %-100 %] 100 %

## 2022-06-21 NOTE — PLAN OF CARE
2030-2300  Neuro: A&Ox4.   Cardiac: SR. VSS.   Respiratory: Sating 100% on RA.  GI/: Adequate urine output via urinal. BM X1 this shift.  Diet/appetite: On regular diet, not eating due to nausea. Willing to try dry carbs overnight to see if they are tolerated.  Activity:  Standby assist up to bathroom.  Pain: Denies pain.  Skin: No new deficits noted.  LDA's: L PIV    Plan: Continue with POC. Notify primary team with changes.

## 2022-06-21 NOTE — CONSULTS
[facility]   Consult Note - Addiction Service     Date of Admission:  6/20/2022    Consult Requested by: Dr. Mack  Reason for Consult: assistance with management of severe alcohol dependence    Assessment & Plan   Jesus is a 36 yo male with a PMHx of depression and long standing alcohol dependence, admitted for nausea, vomiting, and weakness, thought secondary to alcohol withdrawal.    # Alcohol Use Disorder  # hypokalemia  # alcoholic hepatitis  Clearly has severe alcohol dependence. For medication management, we discussed different options to maintain goal of sobriety, including medications to manage cravings.  Acamprosate did not work for him, and liver is stable, so we discussed naltrexone.  He would also benefit from chem dep support, ideally, inpatient chem dep treatment if he is physically capable.  However, he is worried about finances, and isn't sure he can miss work to go to treatment; of course, he hasn't been able to work due to the sequela from alcohol use.  - recommend initiating naltrexone, 50 mg (ordered for you).  Transient increase in LFTs often occur, but liver has not been documented.  Would rec prescribing a month on discharge.  - would benefit from linkage to outpatient addiction medicine; our team will work on linking him to outpatient [facility] provider  - agree with chem dep consult; they can help him navigate whether or not inpatient chem dep treatment is best option for him (he reports interest in mili based programs)  - peth added on, to trend over time    For alcohol withdrawal:  -Continue CIWA;  consider adding on gabapentin, 600 mg TID.  Consider prescribing at discharge if this helps with underlying anxiety.    # Mental health: would recommend linkage to outpatient psych for long term mental health support    # Immunization review:  No hep B or Hep A in [facility]; in April Hep A IgM was tested, but no clear testing documenting  immunity to either of these preventable infections.  - added on Hep A IgG and full hep B serologies.  If not immune, would recommend vaccinations for both prior to dishcharge     # Peer Support:   -Our peer  will meet the patient if agreeable and still hospitalized on Thursday, to provide additional outpatient resources  -To contact Yesenia Peer  from [facility] ([facility]): call or text: [phone]    # Addiction Social Worker:   Our addiction social worker Juwan Linares can be contacted on her pager [phone] or texted/called at [phone]    # Linkage to Care:   - would benefit from linkage to outpatient addiction medicine; our team will work on linking him to outpatient [facility] provider    The patient's care was discussed with the Patient and Primary team.    I spent 120 minutes on the unit/floor managing the care of Jesus Martinez. Over 50% of my time was spent on the following:   Significant education and counseling spent on: how substance use disorders and dependence occur, and how it can become a chronic relapsing and remitting medical condition.  In addition, the pharmacology of medical treatments including naltrexone, the importance of follow up were discussed today.      Corey Kaufman MD  [facility]   Contact information available via [facility] Paging/Directory  Please see sign in/sign out for up to date coverage information    ChAT team (Addiction Consult Team): Coverage Monday-Friday 8-4pm    Provider (Pager)  (Pager)   Mon Dr. Corey Kaufman [phone] Juwan Linares [phone]   Tues Dr. Corey Kaufman [phone] Juwan Linares [phone]   Wed Dr. Corey Kaufman [phone] None   Thurs Dr. Thien Joshi [phone] Juwan Linares [phone]   Fri Dr. Maksim Grayson [phone] Juwan Linares [phone]   [facility] Psych Team- Refer to [facility].  For urgent needs, please place a  consult for psychiatry. None     ______________________________________________________________________    Chief  "Complaint   Alcohol dependence    History is obtained from the patient    History of Present Illness   Jesus is a 37-year-old, with a long history of alcohol dependence.  He was admitted for nausea vomiting and low potassium, and suspected to be an alcohol withdrawal.    He has multiple admissions since February 2021 for alcohol related issues (at least 6).  He reports that as a teenager he had challenges with methamphetamines, but after a 4-month inpatient chemical dependency treatment (he notes that it was spiritually based), he has maintained sobriety from methamphetamine since then.  However after he turned 21 he has been struggling with alcohol use.  He says he has been drinking pretty much every day since then, usually whiskey, sometimes beer.  Sometimes he has a few drinks, and he tried to limit himself to that long time.  He was working as a  before COVID, and has been trying to cut down on his alcohol intake, especially due to some DUIs.  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  And has been very isolated depressed and bored.  This led to a large increase in alcohol.  For about a year and a half until October of last year, he was drinking a half a bottle of whiskey a day (unsure what size).  He went back to work at the [employer] theater October/November.  Since then he has been trying to cut down on his alcohol intake.  However he still drinks alcohol daily.  His mom who he lives with now, does not let him drink in the house, but he walks his dog 4-5 times a day and will take sips of whiskey from a bottle during that time.    His goal is to avoid alcohol because he \"needs to, he is not sure if he can meet that goal on his own.  Ideally he would go to inpatient chemical dependency treatment, potentially another spiritually based program.  However he has very little money, and needs to get back to work.  His mom also is not making much money, and he worries " that they will get evicted if they do not pay rent.    His triggers to drink include depression, as well as boredom.    Alcohol dependence:  MELD-Na score: 12 at 6/21/2022  5:45 AM  MELD score: 12 at 6/21/2022  5:45 AM  Calculated from:  Serum Creatinine: 0.60 mg/dL (Using min of 1 mg/dL) at 6/21/2022  5:45 AM  Serum Sodium: 138 mmol/L (Using max of 137 mmol/L) at 6/21/2022  5:45 AM  Total Bilirubin: 4.0 mg/dL at 6/21/2022  5:45 AM  INR(ratio): 0.98 (Using min of 1) at 6/20/2022  3:24 PM  Age: 37 years    History of alf or MCFP?    Active or prior justice related issues secondary to substance use: DUIs in the past    Identified race/ethnicity: White  Employed: yes, but unable to work due to physical health  Housing status: lost his place, so lives with mom    Review of Systems   The 10 point Review of Systems is negative other than noted in the HPI or here.     Past Medical History:   Diagnosis Date     Anxiety      Benign essential hypertension      Depression        History reviewed. No pertinent surgical history.    Social History   Social History     Socioeconomic History     Marital status: Single     Spouse name: Not on file     Number of children: Not on file     Years of education: Not on file     Highest education level: Not on file   Occupational History     Not on file   Tobacco Use     Smoking status: Current Every Day Smoker     Types: Cigarettes     Smokeless tobacco: Never Used     Tobacco comment: 8 cigs per day   Substance and Sexual Activity     Alcohol use: Yes     Comment: 3-4 drinks per day     Drug use: Never     Sexual activity: Yes     Partners: Female   Other Topics Concern     Not on file   Social History Narrative     at [employer] theater.      Social Determinants of Health     Financial Resource Strain: Not on file   Food Insecurity: Not on file   Transportation Needs: Not on file   Physical Activity: Not on file   Stress: Not on file   Social Connections: Not on file   Intimate  Partner Violence: Not on file   Housing Stability: Not on file       Family History   I have reviewed this patient's family history and updated it with pertinent information if needed.  Family History   Problem Relation Age of Onset     Alcoholism Father          Medications   I have reviewed this patient's current medications    Allergies   No Known Allergies    Physical Exam   Temp: 98.3  F (36.8  C) Temp src: Oral BP: 114/81 Pulse: 70   Resp: 16 SpO2: 98 % O2 Device: None (Room air)    Gen: NAD  HEENT: EOMI, PERRL, MMM  CV: extremities warm and well perfused  Resp: breathing comfortably on RA  : deferred  Msk: no LE edema  Skin: no rashes  Neuro: nonfocal exam        Due to regulation of Title 42 of the Code of Federal Regulations (CFR) Part 2: Confidentiality laws apply to this note and the information wherein.  Thus, this note cannot be copy and pasted into any other health care staff's note nor can it be included in general medical records sent to ANY outside agency without the patient's written consent.

## 2022-06-21 NOTE — PLAN OF CARE
"Neuro: A&Ox4. CIWA [id number] 6-8, oral valium given x1 for tremors and nausea.   Cardiac: SR. VSS, SBP 's.   Respiratory: Sating >98% on RA. Denies SOB, Clear/diminished.   GI/: Adequate urine output. BM X3, liquid.   Diet/appetite: Tolerating regular diet, appetite improving. Nausea relieved by valium.   Activity:  Standby assist.   Pain: Mild headache.    Skin: No new deficits noted.  LDA's: L PIV x2.     Plan: K and Phos replaced, phos recheck scheduled for 8pm tonight. Transfer orders placed. Continue with POC. Notify primary team with changes.  /71 (BP Location: Left arm)   Pulse 86   Temp 98.6  F (37  C) (Oral)   Resp 18   Ht 1.753 m (5' 9\")   Wt 50.7 kg (111 lb 12.4 oz)   SpO2 100%   BMI 16.51 kg/m        "

## 2022-06-21 NOTE — PROGRESS NOTES
"CLINICAL NUTRITION SERVICES - ASSESSMENT NOTE     Nutrition Prescription    Malnutrition Status:    Severe malnutrition in the context of acute on chronic illness    Recommendations already ordered by Registered Dietitian (RD):  Gelatein (cherry) at 10am snack and magic cup (orange) at HS snack     Future/Additional Recommendations:  Monitor tolerance of PO intake and nutrition supplements     REASON FOR ASSESSMENT  Jesus Martinez is a/an 37 year old male assessed by the dietitian for Admission Nutrition Risk Screen for positive- weight loss and decreased appetite     NUTRITION HISTORY  Clinical History: Alcohol use disorder, hypertension, anxiety and depression, who was admitted to [facility] on 6/20/2022 after presenting to the ED with nausea, vomiting, and abdominal pain and found to be in acute alcohol withdrawal. Patient was started on CIWA with valium with improvement in symptoms.     Jesus reports ongoing poor PO intake 2/2 N/V over the past few months. Pt reports tolerating water and also drinking whiskey.     CURRENT NUTRITION ORDERS  Diet: Regular  Intake/Tolerance: Jesus reports tolerating food better since admit. He ate 50% of breakfast this morning and was working on eating a sandwich for lunch. Pt interested in trying nutrition supplements.     LABS  Labs reviewed  K+ 3.1 (L)  Cr 0.6 (L)  Mg 2.3 (WNL)  Phos 1.8 (L)  Alk Phos 174 (H)    MEDICATIONS  Medications reviewed  Folvite 1 mg daily  Thera-vit-m  Thiamine 100 mg  Protonix  Electrolyte replacements ongoing   IV Zofran given 6/20    ANTHROPOMETRICS  Height: 175.3 cm (5' 9\")  Most Recent Weight: 50.7 kg (111 lb 12.4 oz)    IBW: 72.7 kg (70%)   BMI: Underweight BMI <18.5  Weight History: Weight loss of 3.5 kg (6%) over the past 2 months, weight loss does not meet criteria for malnutrition.   Wt Readings from Last 10 Encounters:   06/21/22 50.7 kg (111 lb 12.4 oz)   04/25/22 54.2 kg (119 lb 8 oz)   04/15/22 51.7 kg (114 lb)     49.9 kg (110 lb) 09/29/2021 "      49.9 kg (110 lb) 06/06/2021      Dosing Weight: 51 kg (admit weight)     ASSESSED NUTRITION NEEDS  Estimated Energy Needs: 5690-6449 kcals/day (30 - 35 kcals/kg )  Justification: Underweight  Estimated Protein Needs: 60-75 grams protein/day (1.2 - 1.5 grams of pro/kg)  Justification: Repletion  Estimated Fluid Needs: 6662-6374 mL/day (25 - 30 mL/kg)   Justification: Maintenance    PHYSICAL FINDINGS  See malnutrition section below.    MALNUTRITION  % Intake: </=75% for >/= 1 month (severe)  % Weight Loss: Weight loss does not meet criteria, pt with BMI of 16.51 kg/m^2  Subcutaneous Fat Loss: Facial region, Upper arm, Lower arm and Thoracic/intercostal: Moderate  Muscle Loss: Temporal: mild, Facial & jaw region: mild, Scapular bone: severe, Thoracic region (clavicle, acromium bone, deltoid, trapezius, pectoral): severe, Upper arm (bicep, tricep): moderate, Lower arm  (forearm): moderate, Dorsal hand: moderate, Upper leg (quadricep, hamstring): severe, Patellar region: severe and Posterior calf: severe  Fluid Accumulation/Edema: None noted  Malnutrition Diagnosis: Severe malnutrition in the context of acute on chronic illness    NUTRITION DIAGNOSIS  Inadequate oral intake related to decreased appetite 2/2 N/V as evidenced by pt report     INTERVENTIONS  Implementation  Nutrition Education: Discussed current PO intake. Encouraged small frequent meals. Discussed nutrition supplement options and tips for dealing with nausea.    Medical food supplement therapy: See above      Goals  Patient to consume % of nutritionally adequate meal trays TID, or the equivalent with supplements/snacks.     Monitoring/Evaluation  Progress toward goals will be monitored and evaluated per protocol.    India Raymond, RD, LD  6B RD pager [phone]

## 2022-06-21 NOTE — PROGRESS NOTES
[facility]    Medicine Progress Note - Hospitalist Service, GOLD TEAM 9    Date of Admission:  6/20/2022    Assessment & Plan          Jesus Martinez is a 37 year old male with a history of alcohol use disorder, hypertension, anxiety and depression, who was admitted to [facility] on 6/20/2022 after presenting to the ED with nausea, vomiting, and abdominal pain and found to be in acute alcohol withdrawal. Patient was started on CIWA with valium with improvement in symptoms. Addiction medicine and chem dep consulted to discuss outpatient/inpatient treatment options and medications for his alcohol use.      Acute Alcohol Withdrawal  Alcohol Use Disorder  Patient reports drinking 4-5 drinks of whiskey daily prior to admission, with most recent drink more than 24 hours ago. Ethanol level of <0.01 on admission. Endorses ongoing abdominal pain, nausea, vomiting, all likely consistent with alcohol use. Also with some hallucinations at home noted by patient and mom. Notes 15 year history of excessive alcohol consumption, but denies history of withdrawal seizures and DT, as well as previous treatment for alcohol use. Currently not interested in alcohol treatment as outpatient due to work,   - Continue CIWA protocol  - Continue folic acid, multivitamin, and thiamine  - addiction med and chem dep consulted after discussion with patient      Nausea  Vomiting  Reports ongoing nausea and 6 episodes of mostly non-bloody vomiting for 4-5 months, and has been seen as outpatient and in ED. Has been consuming alcohol as above as well as using marijuana daily. Feel likely related to alcohol use, but marijuana use could also be contributing to a possible cannabinoid hyperemesis syndrome. N/V likely related to gastritis from chronic alcohol consumption. Notes he did take PPI for a week and felt like that improved his symptoms. Will need to re-evaluate if not improved after sustained abstinence from  alcohol use.  - Encourage oral hydration  - Avoid QT prolonging agents  - starting pantoprazole 40 mg daily   - h. Pylori stool antigen ordered      QTc Prolongation  EKG on admission with QTc of 600, which was taken after patient received Zofran, although has been taking continuously as outpatient. Patient also on Zoloft and hydroxyzine as outpatient, which can also cause QT prolongation so question if multifactorial. Follow up EKG with QTc of 497 so will continue to hold QT prolonging medications.  - Continue to monitor  - Cardiac monitoring  - Avoid QT prolonging agents     Hypokalemia  On admission, potassium of 2.9. Likely due to poor oral intake in the setting of excessive alcohol use.  - RN managed potassium replacement protocol     Anion Gap Metabolic Acidosis, resolved  Anion gap of 18 noted on admission. Likely related to excessive alcohol use prior to admission.  - Follow BMP     Leukocytosis  On admission, leukocytosis to 16 with ANC of 13.2. Patient endorses nausea, vomiting, abdominal pain, but denies any other infectious symptoms such as fever, chills, urinary symptoms, cough, shortness of breath. On evaluation, patient non-toxic appearing. Mildly tachycardic and hypertensive, but otherwise afebrile and hemodynamically stable. Unlikely to be infectious etiology behind leukocytosis, likely consistent with alcohol use and ongoing vomiting.   - Follow CBC     Transaminitis  Hyperbilirubinemia  Hepatic Steatosis  On admission, AST, ALT elevated in 2:1 pattern. Alk phos and t bili also elevated. No thrombocytopenia and INR within normal range. Likely consistent with excessive alcohol use. US abdomen performed on admission showed hepatomegaly and hepatic steatosis without convincing evidence of cirrhosis or cholecystitis. No indication of acute alcoholic hepatitis, but is likely steatosis is related to alcohol use.   - CMP daily   - Follow CBC  - Encourage alcohol cessation     Hypomagnesemia, improved  On  admission, magnesium of 1.4. Likely due to poor oral intake in the setting of excessive alcohol use. Received 2 grams of  IV replacement in the ED. Repeat showed improvement and actually slightly elevated.   - magnesium replacement protocol      Elevated Blood Pressure  Tachycardia  History of hypertension  Blood pressure elevated to 150's systolic and heart rate to 110 on admission. Likely consistent with alcohol use. ECG with NSR, but did not QTc prolongation as noted above. Does have history of hypertension, but has not been taking PTA metoprolol for about one week.   - Continue clonidine PRN   - Hold PTA metoprolol for now  - Encourage alcohol cessation     Hypophosphatemia, mild  On admission, phosphorus mildly decreased to 2.4. Likely due to poor oral intake. Will continue to monitor for now and encourage oral intake.  - Phos replacement protocol      Depression, Anxiety Hold PTA on Zoloft and hydroxyzine PRN for now given current QTc prolongation.      Tobacco use disorder Nicotine patches PRN.       Diet: Combination Diet Regular Diet Adult    DVT Prophylaxis: Pneumatic Compression Devices  Wheatley Catheter: Not present  Central Lines: None  Cardiac Monitoring: ACTIVE order. Indication: QTc prolonging medication (48 hours)  Code Status: Full Code      Disposition Plan   Expected Discharge:    Anticipated discharge location:  Awaiting care coordination huddle  Delays:            The patient's care was discussed with the Bedside Nurse, Patient and addiction  Consultant.    Terrance Mack DO  Hospitalist Service, GOLD TEAM [address] [place] [address]  Securely message with the Vocera Web Console (learn more here)  Text page via [facility] Paging/Directory   Please see signed in provider for up to date coverage information      Clinically Significant Risk Factors Present on Admission             # Hypoalbuminemia: Albumin = 2.7 g/dL (Ref range: 3.4 - 5.0 g/dL) on admission, will monitor as  appropriate          ______________________________________________________________________    Interval History     Admitted overnight for alcohol withdrawal. Continues to have N/V but improved with valium.  We discussed his alcohol use and his reticence to go to inpatient treatment as he needs to work to help his mom pay for their living arrangements. After our discussion he was agreeable to talking to chem dep and addiction regarding treatment options.  Anxiety improved. No hallucinations, chest pain, dyspnea, abdominal pain, LE edema.    Four point ROS completed and negative unless listed above     Data reviewed today: I reviewed all medications, new labs and imaging results over the last 24 hours. I personally reviewed the EKG tracing showing NSR with prolonged QT.    Physical Exam   Vital Signs: Temp: 98.2  F (36.8  C) Temp src: Oral BP: 99/68 Pulse: 76   Resp: 16 SpO2: 99 % O2 Device: None (Room air)    Weight: 111 lbs 12.37 oz  General Appearance: In bed. No apparent distress   Respiratory: CTAB   Cardiovascular: RRR, no mumurs, no JVD   GI: Soft NTND  Skin: No lesions or rashes noted, no jaundice noted   Other: No suprapubic tenderness, no peripheral edema      Data   Recent Labs   Lab 06/21/22  1138 06/21/22  0545 06/20/22  1940 06/20/22  1524   WBC  --   --   --  16.0*   HGB  --   --   --  17.4   MCV  --   --   --  95   PLT  --   --   --  217   INR  --   --   --  0.98   NA  --  138  --  136   POTASSIUM 3.7 3.1*  3.1* 2.9* 2.9*   CHLORIDE  --  105  --  97   CO2  --  26  --  21   BUN  --  5*  --  4*   CR  --  0.60*  --  0.94   ANIONGAP  --  7  --  18*   WENDY  --  7.5*  --  9.8   GLC  --  86  --  131*   ALBUMIN  --  2.7*  --  4.2   PROTTOTAL  --  5.6*  --  8.7   BILITOTAL  --  4.0*  --  4.8*   ALKPHOS  --  174*  --  260*   ALT  --  88*  --  128*   AST  --  222*  --  294*   LIPASE  --   --   --  206     Recent Results (from the past 24 hour(s))   Abdomen US, limited (RUQ only)    Narrative    EXAMINATION: US  ABDOMEN LIMITED  6/20/2022 7:16 PM      CLINICAL HISTORY: EtOH, liver enzyme elevation, eval for e/o  cirrhosis, eval for e/o cirrhosis    COMPARISON: None.        FINDINGS:  The liver is normal in contour, demonstrates diffusely increased  parenchymal echogenicity, and measures 18.2 cm. There is no  intrahepatic or extrahepatic biliary ductal dilatation.     The common bile duct measures 3. Mild biliary sludge. No evidence of  abnormal wall thickening, cholecystic fluid. Negative sonographic  Darden sign.    The visualized portions of the pancreas are normal in echogenicity.    The visualized upper abdominal aorta and inferior vena cava are  normal.      The kidneys are normal in position and echogenicity. The right kidney  measures 10.9 cm. There is no hydronephrosis or nephrolithiasis.    Visualized IVC and aorta are unremarkable.      Impression    IMPRESSION:   Hepatomegaly and hepatic steatosis. No convincing morphologic evidence  of cirrhosis.    I have personally reviewed the examination and initial interpretation  and I agree with the findings.    FADIA BLUE DO         SYSTEM ID:  [id number]     Medications       cloNIDine  0.1 mg Oral Q8H     folic acid  1 mg Oral Daily     multivitamin w/minerals  1 tablet Oral Daily     nicotine  1 patch Transdermal Daily     nicotine   Transdermal Q8H     pantoprazole  40 mg Oral QAM AC     sodium chloride (PF)  3 mL Intracatheter Q8H     sodium phosphate  15 mmol Intravenous Once     thiamine  100 mg Oral Daily

## 2022-06-22 LAB
ALBUMIN SERPL BCG-MCNC: 3.2 G/DL (ref 3.5–5.2)
ALP SERPL-CCNC: 162 U/L (ref 40–129)
ALT SERPL W P-5'-P-CCNC: 64 U/L (ref 10–50)
ANION GAP SERPL CALCULATED.3IONS-SCNC: 9 MMOL/L (ref 7–15)
AST SERPL W P-5'-P-CCNC: 104 U/L (ref 10–50)
ATRIAL RATE - MUSE: 69 BPM
BILIRUB DIRECT SERPL-MCNC: 0.88 MG/DL (ref 0–0.3)
BILIRUB SERPL-MCNC: 2.7 MG/DL
BUN SERPL-MCNC: 3.9 MG/DL (ref 6–20)
CALCIUM SERPL-MCNC: 8.5 MG/DL (ref 8.6–10)
CHLORIDE SERPL-SCNC: 105 MMOL/L (ref 98–107)
CREAT SERPL-MCNC: 0.59 MG/DL (ref 0.67–1.17)
DEPRECATED HCO3 PLAS-SCNC: 23 MMOL/L (ref 22–29)
DIASTOLIC BLOOD PRESSURE - MUSE: NORMAL MMHG
GFR SERPL CREATININE-BSD FRML MDRD: >90 ML/MIN/1.73M2
GLUCOSE SERPL-MCNC: 108 MG/DL (ref 70–99)
HOLD SPECIMEN: NORMAL
INTERPRETATION ECG - MUSE: NORMAL
MAGNESIUM SERPL-MCNC: 1.9 MG/DL (ref 1.7–2.3)
P AXIS - MUSE: 65 DEGREES
PHOSPHATE SERPL-MCNC: 2.4 MG/DL (ref 2.5–4.5)
POTASSIUM SERPL-SCNC: 3.6 MMOL/L (ref 3.4–4.5)
POTASSIUM SERPL-SCNC: 3.6 MMOL/L (ref 3.4–4.5)
PR INTERVAL - MUSE: 142 MS
PROT SERPL-MCNC: 5.5 G/DL (ref 6.4–8.3)
QRS DURATION - MUSE: 80 MS
QT - MUSE: 464 MS
QTC - MUSE: 497 MS
R AXIS - MUSE: 64 DEGREES
SODIUM SERPL-SCNC: 137 MMOL/L (ref 136–145)
SYSTOLIC BLOOD PRESSURE - MUSE: NORMAL MMHG
T AXIS - MUSE: 49 DEGREES
VENTRICULAR RATE- MUSE: 69 BPM

## 2022-06-22 PROCEDURE — 120N000003 HC R&B IMCU UMMC

## 2022-06-22 PROCEDURE — 250N000013 HC RX MED GY IP 250 OP 250 PS 637: Performed by: STUDENT IN AN ORGANIZED HEALTH CARE EDUCATION/TRAINING PROGRAM

## 2022-06-22 PROCEDURE — 250N000009 HC RX 250: Performed by: STUDENT IN AN ORGANIZED HEALTH CARE EDUCATION/TRAINING PROGRAM

## 2022-06-22 PROCEDURE — 36415 COLL VENOUS BLD VENIPUNCTURE: CPT

## 2022-06-22 PROCEDURE — 258N000003 HC RX IP 258 OP 636: Performed by: STUDENT IN AN ORGANIZED HEALTH CARE EDUCATION/TRAINING PROGRAM

## 2022-06-22 PROCEDURE — 87338 HPYLORI STOOL AG IA: CPT | Performed by: STUDENT IN AN ORGANIZED HEALTH CARE EDUCATION/TRAINING PROGRAM

## 2022-06-22 PROCEDURE — 250N000013 HC RX MED GY IP 250 OP 250 PS 637: Performed by: EMERGENCY MEDICINE

## 2022-06-22 PROCEDURE — 82248 BILIRUBIN DIRECT: CPT

## 2022-06-22 PROCEDURE — 250N000013 HC RX MED GY IP 250 OP 250 PS 637: Performed by: INTERNAL MEDICINE

## 2022-06-22 PROCEDURE — 83735 ASSAY OF MAGNESIUM: CPT

## 2022-06-22 PROCEDURE — 99233 SBSQ HOSP IP/OBS HIGH 50: CPT | Performed by: STUDENT IN AN ORGANIZED HEALTH CARE EDUCATION/TRAINING PROGRAM

## 2022-06-22 PROCEDURE — 250N000013 HC RX MED GY IP 250 OP 250 PS 637: Performed by: PHYSICIAN ASSISTANT

## 2022-06-22 PROCEDURE — 99207 PR CDG-CUT & PASTE-POTENTIAL IMPACT ON LEVEL: CPT | Performed by: STUDENT IN AN ORGANIZED HEALTH CARE EDUCATION/TRAINING PROGRAM

## 2022-06-22 PROCEDURE — 80053 COMPREHEN METABOLIC PANEL: CPT

## 2022-06-22 PROCEDURE — 84100 ASSAY OF PHOSPHORUS: CPT

## 2022-06-22 RX ORDER — SERTRALINE HYDROCHLORIDE 100 MG/1
100 TABLET, FILM COATED ORAL DAILY
Status: DISCONTINUED | OUTPATIENT
Start: 2022-06-22 | End: 2022-06-25 | Stop reason: HOSPADM

## 2022-06-22 RX ORDER — POTASSIUM CHLORIDE 750 MG/1
20 TABLET, EXTENDED RELEASE ORAL ONCE
Status: COMPLETED | OUTPATIENT
Start: 2022-06-22 | End: 2022-06-22

## 2022-06-22 RX ORDER — GABAPENTIN 300 MG/1
600 CAPSULE ORAL 3 TIMES DAILY
Status: DISCONTINUED | OUTPATIENT
Start: 2022-06-22 | End: 2022-06-25 | Stop reason: HOSPADM

## 2022-06-22 RX ORDER — DIPHENHYDRAMINE HYDROCHLORIDE AND LIDOCAINE HYDROCHLORIDE AND ALUMINUM HYDROXIDE AND MAGNESIUM HYDRO
10 KIT EVERY 6 HOURS PRN
Status: DISCONTINUED | OUTPATIENT
Start: 2022-06-22 | End: 2022-06-25 | Stop reason: HOSPADM

## 2022-06-22 RX ORDER — LOPERAMIDE HCL 2 MG
2 CAPSULE ORAL 4 TIMES DAILY PRN
Status: DISCONTINUED | OUTPATIENT
Start: 2022-06-22 | End: 2022-06-25 | Stop reason: HOSPADM

## 2022-06-22 RX ORDER — ACETAMINOPHEN 325 MG/1
650 TABLET ORAL ONCE
Status: COMPLETED | OUTPATIENT
Start: 2022-06-22 | End: 2022-06-22

## 2022-06-22 RX ADMIN — POTASSIUM CHLORIDE 20 MEQ: 750 TABLET, EXTENDED RELEASE ORAL at 07:56

## 2022-06-22 RX ADMIN — NICOTINE 1 PATCH: 21 PATCH, EXTENDED RELEASE TRANSDERMAL at 07:57

## 2022-06-22 RX ADMIN — DIAZEPAM 10 MG: 5 TABLET ORAL at 19:51

## 2022-06-22 RX ADMIN — Medication 1 TABLET: at 07:56

## 2022-06-22 RX ADMIN — NALTREXONE HYDROCHLORIDE 50 MG: 50 TABLET, FILM COATED ORAL at 07:56

## 2022-06-22 RX ADMIN — DIPHENHYDRAMINE HYDROCHLORIDE AND LIDOCAINE HYDROCHLORIDE AND ALUMINUM HYDROXIDE AND MAGNESIUM HYDRO 10 ML: KIT at 19:52

## 2022-06-22 RX ADMIN — FOLIC ACID 1 MG: 1 TABLET ORAL at 07:56

## 2022-06-22 RX ADMIN — CLONIDINE HYDROCHLORIDE 0.1 MG: 0.1 TABLET ORAL at 00:05

## 2022-06-22 RX ADMIN — CLONIDINE HYDROCHLORIDE 0.1 MG: 0.1 TABLET ORAL at 15:38

## 2022-06-22 RX ADMIN — DIAZEPAM 10 MG: 5 TABLET ORAL at 07:58

## 2022-06-22 RX ADMIN — CLONIDINE HYDROCHLORIDE 0.1 MG: 0.1 TABLET ORAL at 07:56

## 2022-06-22 RX ADMIN — GABAPENTIN 600 MG: 300 CAPSULE ORAL at 07:56

## 2022-06-22 RX ADMIN — THIAMINE HCL TAB 100 MG 100 MG: 100 TAB at 07:56

## 2022-06-22 RX ADMIN — DIPHENHYDRAMINE HYDROCHLORIDE AND LIDOCAINE HYDROCHLORIDE AND ALUMINUM HYDROXIDE AND MAGNESIUM HYDRO 10 ML: KIT at 15:38

## 2022-06-22 RX ADMIN — Medication 5 MG: at 21:33

## 2022-06-22 RX ADMIN — LOPERAMIDE HYDROCHLORIDE 2 MG: 2 CAPSULE ORAL at 11:17

## 2022-06-22 RX ADMIN — SERTRALINE HYDROCHLORIDE 100 MG: 100 TABLET ORAL at 13:11

## 2022-06-22 RX ADMIN — LOPERAMIDE HYDROCHLORIDE 2 MG: 2 CAPSULE ORAL at 19:53

## 2022-06-22 RX ADMIN — GABAPENTIN 600 MG: 300 CAPSULE ORAL at 19:51

## 2022-06-22 RX ADMIN — SODIUM PHOSPHATE, MONOBASIC, MONOHYDRATE AND SODIUM PHOSPHATE, DIBASIC, ANHYDROUS 9 MMOL: 276; 142 INJECTION, SOLUTION INTRAVENOUS at 08:01

## 2022-06-22 RX ADMIN — CLONIDINE HYDROCHLORIDE 0.1 MG: 0.1 TABLET ORAL at 23:38

## 2022-06-22 RX ADMIN — ACETAMINOPHEN 650 MG: 325 TABLET, FILM COATED ORAL at 23:38

## 2022-06-22 RX ADMIN — GABAPENTIN 600 MG: 300 CAPSULE ORAL at 13:11

## 2022-06-22 RX ADMIN — LOPERAMIDE HYDROCHLORIDE 2 MG: 2 CAPSULE ORAL at 15:38

## 2022-06-22 RX ADMIN — DIAZEPAM 10 MG: 5 TABLET ORAL at 12:36

## 2022-06-22 RX ADMIN — PANTOPRAZOLE SODIUM 40 MG: 40 TABLET, DELAYED RELEASE ORAL at 07:56

## 2022-06-22 ASSESSMENT — ACTIVITIES OF DAILY LIVING (ADL)
ADLS_ACUITY_SCORE: 23

## 2022-06-22 NOTE — PLAN OF CARE
Neuro: A&Ox4. On CIWA Q4hrs scored between 4 to 14 PRN Valium given X2 .  Cardiac: SR. VSS  Respiratory: Sating > 97 on RA.  GI/: Adequate urine output. BM x2 loose stool PRN Imodium x1  Diet/appetite: Eating well.  Activity:  Independent    Pain: At acceptable level on current regimen.   Skin: No new deficits noted.  LDA's: X2 PIV    Plan: Continue with POC. Notify primary team with changes.

## 2022-06-22 NOTE — PROGRESS NOTES
[facility]    Medicine Progress Note - Hospitalist Service, GOLD TEAM 9    Date of Admission:  6/20/2022    Assessment & Plan            Jesus Martinez is a 37 year old male with a history of alcohol use disorder, hypertension, anxiety and depression, who was admitted to [facility] on 6/20/2022 after presenting to the ED with nausea, vomiting, and abdominal pain and found to be in acute alcohol withdrawal. Patient was started on CIWA with valium with improvement in symptoms. Addiction medicine and chem dep consulted to discuss outpatient/inpatient treatment options and medications for his alcohol use. Continues to require valium for withdrawal symptoms and currently not interested in inpatient treatment. Hopefully will be improved enough to discharge in 1-2 days.     Acute Alcohol Withdrawal  Alcohol Use Disorder  Patient reports drinking 4-5 drinks of whiskey a few time a week prior to admission, with most recent drink more than 24 hours prior to admission. Ethanol level of <0.01 on admission. Endorses ongoing abdominal pain, nausea, vomiting, all likely consistent with alcohol use. Also with some hallucinations at home noted by patient and mom. Notes 15 year history of excessive alcohol consumption, but denies history of withdrawal seizures and DT, as well as previous treatment for alcohol use. Patient agreeable to talk to our addiction provider and chem dep liason. Started on naltrexone and gabapentin for symptoms however resistant to inpatient or outpatient treatment at this time due to need for work.  - Continue CIWA protocol   - added gabapentin 600 mg TID    - started naltrexone 50 mg daily  - Continue folic acid, multivitamin, and thiamine  - addiction med and chem dep consulted thanks for the recs     Nausea  Vomiting  Reports ongoing nausea and 6 episodes of mostly non-bloody vomiting for 4-5 months, and has been seen as outpatient and in ED. Has been consuming  alcohol as above as well as using marijuana daily. Feel likely related to alcohol use, but marijuana use could also be contributing to a possible cannabinoid hyperemesis syndrome. N/V likely related to gastritis from chronic alcohol consumption. Notes he did take PPI for a week and felt like that improved his symptoms. Will need to re-evaluate if not improved after sustained abstinence from alcohol use.  - Encourage oral hydration  - Avoid QT prolonging agents  - starting pantoprazole 40 mg daily   - h. Pylori stool antigen ordered      QTc Prolongation  EKG on admission with QTc of 600, which was taken after patient received Zofran, although has been taking continuously as outpatient. Patient also on Zoloft and hydroxyzine as outpatient, which can also cause QT prolongation so question if multifactorial. Follow up EKG with QTc of 497 so will continue to hold QT prolonging medications.  - Continue to monitor  - Cardiac monitoring  - Avoid QT prolonging agents     Anion Gap Metabolic Acidosis, resolved  Anion gap of 18 noted on admission. Likely related to excessive alcohol use prior to admission.  - Follow BMP     Leukocytosis  On admission, leukocytosis to 16 with ANC of 13.2. Patient endorses nausea, vomiting, abdominal pain, but denies any other infectious symptoms such as fever, chills, urinary symptoms, cough, shortness of breath. On evaluation, patient non-toxic appearing. Mildly tachycardic and hypertensive, but otherwise afebrile and hemodynamically stable. Unlikely to be infectious etiology behind leukocytosis, likely consistent with alcohol use and ongoing vomiting.   - Follow CBC     Transaminitis  Hyperbilirubinemia  Hepatic Steatosis  On admission, AST, ALT elevated in 2:1 pattern. Alk phos and t bili also elevated. No thrombocytopenia and INR within normal range. Likely consistent with excessive alcohol use. US abdomen performed on admission showed hepatomegaly and hepatic steatosis without convincing  evidence of cirrhosis or cholecystitis. No indication of acute alcoholic hepatitis, but is likely steatosis is related to alcohol use.   - CMP daily   - Follow CBC  - Encourage alcohol cessation     Elevated Blood Pressure  Tachycardia  History of hypertension  Blood pressure elevated to 150's systolic and heart rate to 110 on admission. Likely consistent with alcohol use. ECG with NSR, but did not QTc prolongation as noted above. Does have history of hypertension, but has not been taking PTA metoprolol for about one week.   - Continue clonidine PRN   - Hold PTA metoprolol for now  - Encourage alcohol cessation     Depression, Anxiety Hold PTA on Zoloft and hydroxyzine PRN for now given current QTc prolongation.      Tobacco use disorder Nicotine patches PRN.    Hypomagnesemia, resolved  On admission, magnesium of 1.4. Likely due to poor oral intake in the setting of excessive alcohol use. Received 2 grams of  IV replacement in the ED. Repeat showed improvement and actually slightly elevated.   - magnesium replacement protocol     Hypophosphatemia, mild  On admission, phosphorus mildly decreased to 2.4. Likely due to poor oral intake. Will continue to monitor for now and encourage oral intake.  - Phos replacement protocol     Hypokalemia, resolved  On admission, potassium of 2.9. Likely due to poor oral intake in the setting of excessive alcohol use.  - RN managed potassium replacement protocol       Diet: Combination Diet Regular Diet Adult  Snacks/Supplements Adult: Other; Gelatein (cherry) at 10am snack and magic cup (orange) at HS snack; Between Meals    DVT Prophylaxis: Pneumatic Compression Devices  Wheatley Catheter: Not present  Central Lines: None  Cardiac Monitoring: ACTIVE order. Indication: QTc prolonging medication (48 hours)  Code Status: Full Code      Disposition Plan   Expected Discharge:    Expected Discharge Date: 06/24/2022        Discharge Comments: Still requiring valium but hopeful he can leave  "in 1-2 days   Anticipated discharge location:  Awaiting care coordination huddle  Delays:            The patient's care was discussed with the Bedside Nurse, Patient and addiction  Consultant.    Terrance Mack DO  Hospitalist Service, GOLD TEAM 9  [facility]  Securely message with the Vocera Web Console (learn more here)  Text page via [facility] Paging/Directory   Please see signed in provider for up to date coverage information      Clinically Significant Risk Factors Present on Admission              # Cachexia: Estimated body mass index is 16.54 kg/m  as calculated from the following:    Height as of this encounter: 1.753 m (5' 9\").    Weight as of this encounter: 50.8 kg (111 lb 15.9 oz).    # Severe Malnutrition: based on nutrition assessment     ______________________________________________________________________    Interval History     Was feeling better with valium yesterday however started to feel more shaky today and continued nausea which improved with valium. Continues to have diarrhea. Discussed treatment options and seems more resolved to just go home and try and stay sober on his own. Otherwise denies fevers/chills, chest pain, SOB.    Four point ROS completed and negative unless listed above     Data reviewed today: I reviewed all medications, new labs and imaging results over the last 24 hours. I personally reviewed the EKG tracing showing NSR with prolonged QT.    Physical Exam   Vital Signs: Temp: 97.6  F (36.4  C) Temp src: Oral BP: 106/82 Pulse: 84   Resp: 18 SpO2: 99 % O2 Device: None (Room air)    Weight: 111 lbs 15.9 oz  General Appearance: In bed. No apparent distress   Respiratory: CTAB   Cardiovascular: RRR, no mumurs, no JVD   GI: Soft NTND  Skin: No lesions or rashes noted, no jaundice noted   Other: No suprapubic tenderness, no peripheral edema , minor hand tremors and tongue fasciculations     Data   Recent Labs   Lab 06/22/22  0526 " 06/21/22  1138 06/21/22  0545 06/20/22  1940 06/20/22  1524   WBC  --   --   --   --  16.0*   HGB  --   --   --   --  17.4   MCV  --   --   --   --  95   PLT  --   --   --   --  217   INR  --   --   --   --  0.98     --  138  --  136   POTASSIUM 3.6  3.6 3.7 3.1*  3.1*   < > 2.9*   CHLORIDE 105  --  105  --  97   CO2 23  --  26  --  21   BUN 3.9*  --  5*  --  4*   CR 0.59*  --  0.60*  --  0.94   ANIONGAP 9  --  7  --  18*   WENDY 8.5*  --  7.5*  --  9.8   *  --  86  --  131*   ALBUMIN 3.2*  --  2.7*  --  4.2   PROTTOTAL 5.5*  --  5.6*  --  8.7   BILITOTAL 2.7*  --  4.0*  --  4.8*   ALKPHOS 162*  --  174*  --  260*   ALT 64*  --  88*  --  128*   *  --  222*  --  294*   LIPASE  --   --   --   --  206    < > = values in this interval not displayed.     No results found for this or any previous visit (from the past 24 hour(s)).  Medications       cloNIDine  0.1 mg Oral Q8H     folic acid  1 mg Oral Daily     gabapentin  600 mg Oral TID     multivitamin w/minerals  1 tablet Oral Daily     naltrexone  50 mg Oral Daily     nicotine  1 patch Transdermal Daily     nicotine   Transdermal Q8H     pantoprazole  40 mg Oral QAM AC     sertraline  100 mg Oral Daily     sodium chloride (PF)  3 mL Intracatheter Q8H     thiamine  100 mg Oral Daily

## 2022-06-22 NOTE — CONSULTS
"CD consult acknowledged and closing.    Patient stated he is not interested in attending inpatient treatment at this time due to having to work to avoid eviction.  Patient stated he has been out of work for 3 months due to his medical issues which have been most likely caused by his alcohol abuse.  Patient said that he has been to IP treatment before around 10 years ago at [facility] for methamphetamine abuse.  Patient said he has also had 2 DUIs and attended OP treatment 5 years ago after his second DUI.  Patient said that he has no sober supports but also dislikes AA saying \"it didn't work.\"      Patient said that he spoke with his mother last night and she was in agreement that he can return to her home as long as he works and pays rent, which is why patient said he needs to discharge home to go back to work as a .  Patient is at a high risk of relapse and agrees that he has no skills to prevent relapse and is relying on the medication prescribed to help him.  Patient disagreed with the notes that he drinks 4-5 alcohol drinks daily.  Patient did say he was drinking \"shooters\" of vodka when taking his dog on a walk, but when asked about Fireball he said \"No, I drink whiskey.\"  Patient was evasive and did not want to complete an evaluation for inpatient placement.  Patient did agree to complete an OP CD assessment and asked this writer to call back in 15 minutes after he was finished speaking with his nurse.  Attempted to call patient again, and there was no answer.      Called the unit and was transferred to the patient.  Patient stated that inpatient treatment is \"not an option\" for him right now and that he will keep thinking about it.  Spoke with the patient about his high risk of relapse (no sober supports, working as a , no sober coping skills) and medical complications due to alcohol abuse and he said he understood and plans on returning home, working as a  and abstaining from " alcohol on his own.  Patient said he may be interested in outpatient treatment but barriers to care include taking care of his dog and working.  Patient was advised that if he changes his mind before discharge that he can ask for the consult to be reopened and an assessment can be completed to assist him in entry into a residential CD treatment program.    PLACE DISCHARGE  ORDER 9035 at the time of discharge which will go into a work queue for [facility] to call and coordinate a CYNTHIA assessment appointment after discharge as an outpatient appointment.    Patient can call [facility] [phone]  for a virtual Outpatient CYNTHIA Assessment.    Evelin Hanley MA, [facility]  Phone: [phone]  Email: [email or web address]

## 2022-06-23 LAB
ALBUMIN SERPL BCG-MCNC: 3.2 G/DL (ref 3.5–5.2)
ALP SERPL-CCNC: 152 U/L (ref 40–129)
ALT SERPL W P-5'-P-CCNC: 50 U/L (ref 10–50)
ANION GAP SERPL CALCULATED.3IONS-SCNC: 9 MMOL/L (ref 7–15)
AST SERPL W P-5'-P-CCNC: 56 U/L (ref 10–50)
BILIRUB SERPL-MCNC: 1.2 MG/DL
BUN SERPL-MCNC: 5.1 MG/DL (ref 6–20)
CALCIUM SERPL-MCNC: 8.9 MG/DL (ref 8.6–10)
CHLORIDE SERPL-SCNC: 104 MMOL/L (ref 98–107)
CREAT SERPL-MCNC: 0.55 MG/DL (ref 0.67–1.17)
DEPRECATED HCO3 PLAS-SCNC: 24 MMOL/L (ref 22–29)
ERYTHROCYTE [DISTWIDTH] IN BLOOD BY AUTOMATED COUNT: 12.9 % (ref 10–15)
GFR SERPL CREATININE-BSD FRML MDRD: >90 ML/MIN/1.73M2
GLUCOSE SERPL-MCNC: 111 MG/DL (ref 70–99)
H PYLORI AG STL QL IA: NEGATIVE
HCT VFR BLD AUTO: 38.4 % (ref 40–53)
HGB BLD-MCNC: 12.7 G/DL (ref 13.3–17.7)
MAGNESIUM SERPL-MCNC: 1.9 MG/DL (ref 1.7–2.3)
MCH RBC QN AUTO: 34.3 PG (ref 26.5–33)
MCHC RBC AUTO-ENTMCNC: 33.1 G/DL (ref 31.5–36.5)
MCV RBC AUTO: 104 FL (ref 78–100)
PHOSPHATE SERPL-MCNC: 3.7 MG/DL (ref 2.5–4.5)
PLATELET # BLD AUTO: 168 10E3/UL (ref 150–450)
POTASSIUM SERPL-SCNC: 3.4 MMOL/L (ref 3.4–4.5)
POTASSIUM SERPL-SCNC: 3.4 MMOL/L (ref 3.4–4.5)
POTASSIUM SERPL-SCNC: 4.1 MMOL/L (ref 3.4–5.3)
PROT SERPL-MCNC: 5.8 G/DL (ref 6.4–8.3)
RBC # BLD AUTO: 3.7 10E6/UL (ref 4.4–5.9)
SODIUM SERPL-SCNC: 137 MMOL/L (ref 136–145)
WBC # BLD AUTO: 5.7 10E3/UL (ref 4–11)

## 2022-06-23 PROCEDURE — 84132 ASSAY OF SERUM POTASSIUM: CPT | Performed by: INTERNAL MEDICINE

## 2022-06-23 PROCEDURE — 250N000013 HC RX MED GY IP 250 OP 250 PS 637: Performed by: INTERNAL MEDICINE

## 2022-06-23 PROCEDURE — 120N000003 HC R&B IMCU UMMC

## 2022-06-23 PROCEDURE — 84100 ASSAY OF PHOSPHORUS: CPT | Performed by: STUDENT IN AN ORGANIZED HEALTH CARE EDUCATION/TRAINING PROGRAM

## 2022-06-23 PROCEDURE — 84132 ASSAY OF SERUM POTASSIUM: CPT | Performed by: STUDENT IN AN ORGANIZED HEALTH CARE EDUCATION/TRAINING PROGRAM

## 2022-06-23 PROCEDURE — 93005 ELECTROCARDIOGRAM TRACING: CPT

## 2022-06-23 PROCEDURE — 250N000013 HC RX MED GY IP 250 OP 250 PS 637: Performed by: STUDENT IN AN ORGANIZED HEALTH CARE EDUCATION/TRAINING PROGRAM

## 2022-06-23 PROCEDURE — 85027 COMPLETE CBC AUTOMATED: CPT | Performed by: STUDENT IN AN ORGANIZED HEALTH CARE EDUCATION/TRAINING PROGRAM

## 2022-06-23 PROCEDURE — 99207 PR CDG-CUT & PASTE-POTENTIAL IMPACT ON LEVEL: CPT | Performed by: STUDENT IN AN ORGANIZED HEALTH CARE EDUCATION/TRAINING PROGRAM

## 2022-06-23 PROCEDURE — 36415 COLL VENOUS BLD VENIPUNCTURE: CPT | Performed by: INTERNAL MEDICINE

## 2022-06-23 PROCEDURE — 93010 ELECTROCARDIOGRAM REPORT: CPT | Performed by: INTERNAL MEDICINE

## 2022-06-23 PROCEDURE — 80053 COMPREHEN METABOLIC PANEL: CPT

## 2022-06-23 PROCEDURE — 83735 ASSAY OF MAGNESIUM: CPT

## 2022-06-23 PROCEDURE — 99233 SBSQ HOSP IP/OBS HIGH 50: CPT | Performed by: STUDENT IN AN ORGANIZED HEALTH CARE EDUCATION/TRAINING PROGRAM

## 2022-06-23 PROCEDURE — 250N000013 HC RX MED GY IP 250 OP 250 PS 637: Performed by: EMERGENCY MEDICINE

## 2022-06-23 PROCEDURE — 36415 COLL VENOUS BLD VENIPUNCTURE: CPT | Performed by: STUDENT IN AN ORGANIZED HEALTH CARE EDUCATION/TRAINING PROGRAM

## 2022-06-23 RX ORDER — POTASSIUM CHLORIDE 1.5 G/1.58G
40 POWDER, FOR SOLUTION ORAL ONCE
Status: COMPLETED | OUTPATIENT
Start: 2022-06-23 | End: 2022-06-23

## 2022-06-23 RX ORDER — ACETAMINOPHEN 325 MG/1
975 TABLET ORAL EVERY 8 HOURS PRN
Status: DISCONTINUED | OUTPATIENT
Start: 2022-06-23 | End: 2022-06-25 | Stop reason: HOSPADM

## 2022-06-23 RX ADMIN — DIAZEPAM 10 MG: 5 TABLET ORAL at 18:30

## 2022-06-23 RX ADMIN — POTASSIUM CHLORIDE 40 MEQ: 1.5 POWDER, FOR SOLUTION ORAL at 08:03

## 2022-06-23 RX ADMIN — Medication 5 MG: at 21:42

## 2022-06-23 RX ADMIN — NALTREXONE HYDROCHLORIDE 50 MG: 50 TABLET, FILM COATED ORAL at 08:15

## 2022-06-23 RX ADMIN — THIAMINE HCL TAB 100 MG 100 MG: 100 TAB at 08:03

## 2022-06-23 RX ADMIN — DIAZEPAM 10 MG: 5 TABLET ORAL at 08:01

## 2022-06-23 RX ADMIN — FOLIC ACID 1 MG: 1 TABLET ORAL at 08:03

## 2022-06-23 RX ADMIN — GABAPENTIN 600 MG: 300 CAPSULE ORAL at 13:47

## 2022-06-23 RX ADMIN — PANTOPRAZOLE SODIUM 40 MG: 40 TABLET, DELAYED RELEASE ORAL at 08:03

## 2022-06-23 RX ADMIN — CLONIDINE HYDROCHLORIDE 0.1 MG: 0.1 TABLET ORAL at 08:02

## 2022-06-23 RX ADMIN — ACETAMINOPHEN 975 MG: 325 TABLET, FILM COATED ORAL at 16:02

## 2022-06-23 RX ADMIN — GABAPENTIN 600 MG: 300 CAPSULE ORAL at 08:02

## 2022-06-23 RX ADMIN — NICOTINE 1 PATCH: 21 PATCH, EXTENDED RELEASE TRANSDERMAL at 08:02

## 2022-06-23 RX ADMIN — CLONIDINE HYDROCHLORIDE 0.1 MG: 0.1 TABLET ORAL at 15:30

## 2022-06-23 RX ADMIN — SERTRALINE HYDROCHLORIDE 100 MG: 100 TABLET ORAL at 08:15

## 2022-06-23 RX ADMIN — GABAPENTIN 600 MG: 300 CAPSULE ORAL at 19:41

## 2022-06-23 RX ADMIN — DIPHENHYDRAMINE HYDROCHLORIDE AND LIDOCAINE HYDROCHLORIDE AND ALUMINUM HYDROXIDE AND MAGNESIUM HYDRO 10 ML: KIT at 19:44

## 2022-06-23 RX ADMIN — Medication 1 TABLET: at 08:02

## 2022-06-23 ASSESSMENT — ACTIVITIES OF DAILY LIVING (ADL)
ADLS_ACUITY_SCORE: 23

## 2022-06-23 NOTE — PLAN OF CARE
Neuro: A&Ox4. On CIWA Q4hrs scored between 2 and 13; PRN Valium given X1 at 2000.  Cardiac: SR. HR 50s-70s. SBPs: 120s-140s. VSS. Denies chest pain.  Respiratory: Sating high 90s on RA. Denies SOB.  GI/: Adequate urine output. PRN Imodium requested per Pt and given x1 at 2000. No BM overnight.   Diet/appetite: Regular.  Activity:  Independent    Pain: Slight headache at 2000 and 0000. PRN Tylenol given x1. Tylenol OK to give Pt per Provider.  Skin: No new deficits noted.  LDA's: PIV x2

## 2022-06-23 NOTE — PLAN OF CARE
NEURO: alert and oriented x4. Ciwa- 8 (given valium PO) , following CIWAs <7.   RESPIRATORY: LS clear, SpO2>92%on RA.   CARDIO: Sinus Taran. VSS.   GI/: BS active. Voiding without difficulty. Tolerating PO intake. K+ replaced (3.4)- recheck 4.1.   SKIN: Intact.  ACTIVITY: up ad lula.   PAIN: Reported mild headache in AM, declined tylenol. Pt reported no HA in afternoon.   LINES: PIV saline locked- site WNL.   PLAN:  Continue POC- notify MD with concerns. Possible discharge tomorrow.

## 2022-06-23 NOTE — PROGRESS NOTES
Addiction Medicine Team Social Work assessment    Date of Assessment: 06/23/22    Referred by: Dr. Mack  Reason for consult: assistance with management of severe alcohol dependence  Patient information: Pt is a 37 year old male with long standing alcohol dependence, admitted for nausea, vomiting and weakness, thought secondary to alcohol withdrawal.      Dr. Kaufman, Addiction Attending yesterday, suggests that writer see pt regarding his severe psychosocial stressors.    Sources of information: Patient  Writer met with pt, introduced self, role and reason for the visit.  Pt tells writer that Yesenia, Peer , met with him last evening and found their visit very informational and supportive.  He states that she informed him of some outpatient CD tx's in his area that he was interested in.  She gave him the following resources:  [email or web address]   ([place] [facility])  Https://[employer].[email or web address]  ([employer] [facility] & CYNTHIA resource/treatment )    Presenting Problem: Pt states that he has been out of work for 3 months dealing with nausea and vomiting related to his alcohol use disorder.  He states he is currently staying with his mother but their relationship is tenuous.  He reports that he can only remain living with his mother if he works and contributes with a steady paycheck.  They live in a low-income subsidized housing that is based on his mom's income.  He states he has been living off of his savings but this is dwindling fast and he fears homelessness.        Current Housing: Pt lives at his mom's place.  Stable? Per patient, no.  He states that right now he is welcome to stay, however could be asked to leave anytime.      Support system: Pt reports that his support is tentative from his mom.  He reports that she has her own struggles and psychosocial stressors.  He also mentions his brother went through the [place] [facility] program in the past.  He states he  "'left his using friends'.       Professional: He reports that his boss has been supportive and flexible.  He plans to return to work as a , but given his health issues, his boss supports him working short shifts to start out with.     Current community resources: None currently.  He would like more info on outpatient CD tx, psychotherapy, housing and rental assistance resources.  Writer will pull together these resources and meet back with pt either later today or tomorrow.    Primary Care Clinic: Did not discuss.    Employment status: Pt states he has not been able to work for the last 3 months due to his CYNTHIA and health issues, but plans to return to work once recovered from this hospitalization.  He works as a  3 shifts a week, 5pm-midnight.  He states his pay is solely based on tips.  Source of income: Employment.  Savings.    Current Substance Use: Alcohol.  Drug of choice: Alcohol      Treatment History: Pt states he went to tx in the distance past for methamphetamine use disorder and has been able to achieve sobriety from meth since.  He states he tried AA but \"didn't like it\" and states he struggled to relate to other people's situations (ie veterans telling 'war stories' and people experiencing housing insecurity 'sleeping on the curb').  He states he saw a psychotherapy for counseling a 'long time ago'.        Current signs of Withdrawal?: Yes.  He states he continues to feel better but still somewhat tremulous and feels anxious.        Any chronic conditions that affect treatment: No    Mental health: Did not discuss this in detail though talked about seeing a therapist, which pt is interested in.    Prior to admission: ADL's: Independent.    Coping skills with  emotional, behavioral or cognitive problems : He states that he is very close to his dog \"he's the reason I'm still alive\".  Caring for his dog and being with his dog gives him purpose.      Social Determinants to Health: Severe " toxic stress due to financial stressors, risk of homelessness, and strained familial relationships.        Strengths Identified: Access to care.    Current stage of change: Contemplation      Patient stated goals:      Better overall health      Clinical Social Work Interventions: relapse prevention;  psychoeducation; re-framing; resource referral; introduction of Addiction Med clinical social work interventions; adjustment to illness counseling      Recommendations & Plan: Pt voices appreciation with discussion of resources and supports and is open to receiving any information, though states he is unable to go to a residential tx center right now due to the need to work.  He feels he must work or face homelessness.  We talked through emergency housing options if that happens.  He states he would not go to a shelter but would like other housing options.   We also talked about outpatient CD tx options that occur during the day so that he can work at night.  Writer also suggested pt see a psychotherapist as pt struggled with the group aspect of treatment in the past, and that therapists generally have openings during the daytime and pt's relationships have been greatly impacted by CYNTHIA.  Pt voices that he is aware that a residential tx center would be helpful given the severity of his CYNTHIA but just feels he cannot take off any more work to do this.      Writer will put together a resource list and meet back with pt either later today or tomorrow.  Pt states he anticipates discharging tomorrow, Friday.        Due to regulation of Title 42 of the Code of Federal Regulations (CFR) Part 2: Confidentiality laws apply to this note and the information wherein.  Thus, this note cannot be copy and pasted into any other health care staff's note nor can it be included in general medical records sent to ANY outside agency without the patient's written consent.    PAULA Simmons  She/her/hers  Social Work Services  Addiction  Team  [phone] work cell phone  [phone] pager  8:00am-4:30pm M/T/Th/F; Off Wednesday's

## 2022-06-23 NOTE — PROGRESS NOTES
[facility]    Medicine Progress Note - Hospitalist Service, GOLD TEAM 9    Date of Admission:  6/20/2022    Assessment & Plan            Jesus Martinez is a 37 year old male with a history of alcohol use disorder, hypertension, anxiety and depression, who was admitted to [facility] on 6/20/2022 after presenting to the ED with nausea, vomiting, and abdominal pain and found to be in acute alcohol withdrawal. Patient was started on CIWA with valium with improvement in symptoms. Addiction medicine and chem dep consulted to discuss outpatient/inpatient treatment options and medications for his alcohol use. Continues to require valium for withdrawal symptoms and currently not interested in inpatient treatment. Hopefully will be improved enough to discharge tomorrow.     Acute Alcohol Withdrawal  Alcohol Use Disorder  Patient reports drinking 4-5 drinks of whiskey a few time a week prior to admission, with most recent drink more than 24 hours prior to admission. Ethanol level of <0.01 on admission. Endorses ongoing abdominal pain, nausea, vomiting, all likely consistent with alcohol use. Also with some hallucinations at home noted by patient and mom. Notes 15 year history of excessive alcohol consumption, but denies history of withdrawal seizures and DT, as well as previous treatment for alcohol use. Patient agreeable to talk to our addiction provider and chem dep liason. Started on naltrexone and gabapentin for symptoms however resistant to inpatient or outpatient treatment at this time due to need for work.  - Continue CIWA protocol   - continue gabapentin 600 mg TID    - continue naltrexone 50 mg daily  - Continue folic acid, multivitamin, and thiamine  - addiction med and chem dep consulted thanks for the recs     Nausea  Vomiting  Reports ongoing nausea and 6 episodes of mostly non-bloody vomiting for 4-5 months, and has been seen as outpatient and in ED. Has been consuming  alcohol as above as well as using marijuana daily. Feel likely related to alcohol use, but marijuana use could also be contributing to a possible cannabinoid hyperemesis syndrome. N/V likely related to gastritis from chronic alcohol consumption. Notes he did take PPI for a week and felt like that improved his symptoms. Will need to re-evaluate if not improved after sustained abstinence from alcohol use.  - Encourage oral hydration  - Avoid QT prolonging agents  - continue pantoprazole 40 mg daily      QTc Prolongation  EKG on admission with QTc of 600, which was taken after patient received Zofran, although has been taking continuously as outpatient. Patient also on Zoloft and hydroxyzine as outpatient, which can also cause QT prolongation so question if multifactorial. Follow up EKG with QTc of 497 so will continue to hold QT prolonging medications.  - Continue to monitor  - Cardiac monitoring  - Avoid QT prolonging agents     Anion Gap Metabolic Acidosis, resolved  Anion gap of 18 noted on admission. Likely related to excessive alcohol use prior to admission.    Leukocytosis, resolved   On admission, leukocytosis to 16 with ANC of 13.2. Patient endorses nausea, vomiting, abdominal pain, but denies any other infectious symptoms such as fever, chills, urinary symptoms, cough, shortness of breath. On evaluation, patient non-toxic appearing. Mildly tachycardic and hypertensive, but otherwise afebrile and hemodynamically stable. Unlikely to be infectious etiology behind leukocytosis, likely consistent with alcohol use and ongoing vomiting.   - Follow CBC     Transaminitis  Hyperbilirubinemia  Hepatic Steatosis  On admission, AST, ALT elevated in 2:1 pattern. Alk phos and t bili also elevated. No thrombocytopenia and INR within normal range. Likely consistent with excessive alcohol use. US abdomen performed on admission showed hepatomegaly and hepatic steatosis without convincing evidence of cirrhosis or cholecystitis.  No indication of acute alcoholic hepatitis, but is likely steatosis is related to alcohol use.   - CMP daily   - Follow CBC  - Encourage alcohol cessation     Elevated Blood Pressure  Tachycardia  History of hypertension  Blood pressure elevated to 150's systolic and heart rate to 110 on admission. Likely consistent with alcohol use. ECG with NSR, but did not QTc prolongation as noted above. Does have history of hypertension, but has not been taking PTA metoprolol for about one week.   - Continue clonidine PRN   - Hold PTA metoprolol for now  - Encourage alcohol cessation     Depression  Anxiety  Hold hydroxyzine PRN for now given current QTc prolongation.   - restarted zoloft      Tobacco use disorder Nicotine patches PRN.    Hypomagnesemia, resolved  On admission, magnesium of 1.4. Likely due to poor oral intake in the setting of excessive alcohol use. Received 2 grams of  IV replacement in the ED. Repeat showed improvement and actually slightly elevated.   - magnesium replacement protocol     Hypophosphatemia, mild  On admission, phosphorus mildly decreased to 2.4. Likely due to poor oral intake. Will continue to monitor for now and encourage oral intake.  - Phos replacement protocol     Hypokalemia, resolved  On admission, potassium of 2.9. Likely due to poor oral intake in the setting of excessive alcohol use.  - RN managed potassium replacement protocol    Macrocytic anemia   Likely due to alcohol use   - continue to monitor        Diet: Combination Diet Regular Diet Adult  Snacks/Supplements Adult: Other; Gelatein (cherry) at 10am snack and magic cup (orange) at HS snack; Between Meals    DVT Prophylaxis: Pneumatic Compression Devices  Wheatley Catheter: Not present  Central Lines: None  Cardiac Monitoring: None  Code Status: Full Code      Disposition Plan   Expected Discharge:    Expected Discharge Date: 06/24/2022        Discharge Comments: Still requiring valium but hopeful he can leave tomorrow  "  Anticipated discharge location:  Awaiting care coordination huddle  Delays:            The patient's care was discussed with the Bedside Nurse, Patient and addiction  Consultant.    Terrance Mack DO  Hospitalist Service, GOLD TEAM 9  [facility]  Securely message with the Vocera Web Console (learn more here)  Text page via [facility] Paging/Directory   Please see signed in provider for up to date coverage information      Clinically Significant Risk Factors Present on Admission              # Cachexia: Estimated body mass index is 17.09 kg/m  as calculated from the following:    Height as of this encounter: 1.753 m (5' 9\").    Weight as of this encounter: 52.5 kg (115 lb 11.9 oz).    # Severe Malnutrition: based on nutrition assessment     ______________________________________________________________________    Interval History     No acute events overnight. Diarrhea has improved. Continues to need a dose of valium intermittently for anxiety and tremors. Discussed discharge plans and resources. Addiction SW was to speak to patient regarding resources. He is otherwise doing well.     Four point ROS completed and negative unless listed above     Data reviewed today: I reviewed all medications, new labs and imaging results over the last 24 hours. I personally reviewed the EKG tracing showing NSR with prolonged QT.    Physical Exam   Vital Signs: Temp: 97.6  F (36.4  C) Temp src: Oral BP: (!) 120/90 Pulse: 66   Resp: 16 SpO2: 99 %      Weight: 115 lbs 11.86 oz  General Appearance: In bed. No apparent distress   Respiratory: CTAB   Cardiovascular: RRR, no mumurs, no JVD   GI: Soft NTND  Skin: No lesions or rashes noted, no jaundice noted   Other: No suprapubic tenderness, no peripheral edema , minor hand tremors and tongue fasciculations     Data   Recent Labs   Lab 06/23/22  1136 06/23/22  0544 06/22/22  0526 06/21/22  1138 06/21/22  0545 06/20/22  1940 06/20/22  1524   WBC  --  " 5.7  --   --   --   --  16.0*   HGB  --  12.7*  --   --   --   --  17.4   MCV  --  104*  --   --   --   --  95   PLT  --  168  --   --   --   --  217   INR  --   --   --   --   --   --  0.98   NA  --  137 137  --  138  --  136   POTASSIUM 4.1 3.4  3.4 3.6  3.6   < > 3.1*  3.1*   < > 2.9*   CHLORIDE  --  104 105  --  105  --  97   CO2  --  24 23  --  26  --  21   BUN  --  5.1* 3.9*  --  5*  --  4*   CR  --  0.55* 0.59*  --  0.60*  --  0.94   ANIONGAP  --  9 9  --  7  --  18*   WENDY  --  8.9 8.5*  --  7.5*  --  9.8   GLC  --  111* 108*  --  86  --  131*   ALBUMIN  --  3.2* 3.2*  --  2.7*  --  4.2   PROTTOTAL  --  5.8* 5.5*  --  5.6*  --  8.7   BILITOTAL  --  1.2 2.7*  --  4.0*  --  4.8*   ALKPHOS  --  152* 162*  --  174*  --  260*   ALT  --  50 64*  --  88*  --  128*   AST  --  56* 104*  --  222*  --  294*   LIPASE  --   --   --   --   --   --  206    < > = values in this interval not displayed.     No results found for this or any previous visit (from the past 24 hour(s)).  Medications       cloNIDine  0.1 mg Oral Q8H     folic acid  1 mg Oral Daily     gabapentin  600 mg Oral TID     multivitamin w/minerals  1 tablet Oral Daily     naltrexone  50 mg Oral Daily     nicotine  1 patch Transdermal Daily     nicotine   Transdermal Q8H     pantoprazole  40 mg Oral QAM AC     sertraline  100 mg Oral Daily     sodium chloride (PF)  3 mL Intracatheter Q8H     thiamine  100 mg Oral Daily

## 2022-06-24 LAB
ALBUMIN SERPL BCG-MCNC: 3.6 G/DL (ref 3.5–5.2)
ALP SERPL-CCNC: 155 U/L (ref 40–129)
ALT SERPL W P-5'-P-CCNC: 48 U/L (ref 10–50)
ANION GAP SERPL CALCULATED.3IONS-SCNC: 8 MMOL/L (ref 7–15)
AST SERPL W P-5'-P-CCNC: 47 U/L (ref 10–50)
BILIRUB SERPL-MCNC: 0.9 MG/DL
BUN SERPL-MCNC: 7.9 MG/DL (ref 6–20)
CALCIUM SERPL-MCNC: 9.1 MG/DL (ref 8.6–10)
CHLORIDE SERPL-SCNC: 102 MMOL/L (ref 98–107)
CREAT SERPL-MCNC: 0.56 MG/DL (ref 0.67–1.17)
DEPRECATED HCO3 PLAS-SCNC: 24 MMOL/L (ref 22–29)
ERYTHROCYTE [DISTWIDTH] IN BLOOD BY AUTOMATED COUNT: 13 % (ref 10–15)
GFR SERPL CREATININE-BSD FRML MDRD: >90 ML/MIN/1.73M2
GLUCOSE SERPL-MCNC: 92 MG/DL (ref 70–99)
HCT VFR BLD AUTO: 40.8 % (ref 40–53)
HGB BLD-MCNC: 13.4 G/DL (ref 13.3–17.7)
MAGNESIUM SERPL-MCNC: 1.8 MG/DL (ref 1.7–2.3)
MCH RBC QN AUTO: 34.7 PG (ref 26.5–33)
MCHC RBC AUTO-ENTMCNC: 32.8 G/DL (ref 31.5–36.5)
MCV RBC AUTO: 106 FL (ref 78–100)
PHOSPHATE SERPL-MCNC: 2.4 MG/DL (ref 2.5–4.5)
PLATELET # BLD AUTO: 192 10E3/UL (ref 150–450)
PLPETH BLD-MCNC: 816 NG/ML
POPETH BLD-MCNC: 1602 NG/ML
POTASSIUM SERPL-SCNC: 4 MMOL/L (ref 3.4–5.3)
PROT SERPL-MCNC: 6.4 G/DL (ref 6.4–8.3)
RBC # BLD AUTO: 3.86 10E6/UL (ref 4.4–5.9)
SODIUM SERPL-SCNC: 134 MMOL/L (ref 136–145)
WBC # BLD AUTO: 6.9 10E3/UL (ref 4–11)

## 2022-06-24 PROCEDURE — 99207 PR CDG-CUT & PASTE-POTENTIAL IMPACT ON LEVEL: CPT | Performed by: STUDENT IN AN ORGANIZED HEALTH CARE EDUCATION/TRAINING PROGRAM

## 2022-06-24 PROCEDURE — 99233 SBSQ HOSP IP/OBS HIGH 50: CPT | Performed by: STUDENT IN AN ORGANIZED HEALTH CARE EDUCATION/TRAINING PROGRAM

## 2022-06-24 PROCEDURE — 250N000013 HC RX MED GY IP 250 OP 250 PS 637: Performed by: EMERGENCY MEDICINE

## 2022-06-24 PROCEDURE — 250N000013 HC RX MED GY IP 250 OP 250 PS 637: Performed by: INTERNAL MEDICINE

## 2022-06-24 PROCEDURE — 83735 ASSAY OF MAGNESIUM: CPT

## 2022-06-24 PROCEDURE — 120N000003 HC R&B IMCU UMMC

## 2022-06-24 PROCEDURE — 84100 ASSAY OF PHOSPHORUS: CPT

## 2022-06-24 PROCEDURE — 250N000013 HC RX MED GY IP 250 OP 250 PS 637: Performed by: STUDENT IN AN ORGANIZED HEALTH CARE EDUCATION/TRAINING PROGRAM

## 2022-06-24 PROCEDURE — 82040 ASSAY OF SERUM ALBUMIN: CPT

## 2022-06-24 PROCEDURE — 250N000013 HC RX MED GY IP 250 OP 250 PS 637

## 2022-06-24 PROCEDURE — 80053 COMPREHEN METABOLIC PANEL: CPT

## 2022-06-24 PROCEDURE — 36415 COLL VENOUS BLD VENIPUNCTURE: CPT | Performed by: STUDENT IN AN ORGANIZED HEALTH CARE EDUCATION/TRAINING PROGRAM

## 2022-06-24 PROCEDURE — 85027 COMPLETE CBC AUTOMATED: CPT | Performed by: STUDENT IN AN ORGANIZED HEALTH CARE EDUCATION/TRAINING PROGRAM

## 2022-06-24 RX ORDER — CALCIUM CARBONATE 500 MG/1
500 TABLET, CHEWABLE ORAL DAILY PRN
Status: DISCONTINUED | OUTPATIENT
Start: 2022-06-24 | End: 2022-06-25 | Stop reason: HOSPADM

## 2022-06-24 RX ADMIN — POTASSIUM & SODIUM PHOSPHATES POWDER PACK 280-160-250 MG 1 PACKET: 280-160-250 PACK at 17:15

## 2022-06-24 RX ADMIN — NALTREXONE HYDROCHLORIDE 50 MG: 50 TABLET, FILM COATED ORAL at 10:16

## 2022-06-24 RX ADMIN — Medication 1 TABLET: at 09:47

## 2022-06-24 RX ADMIN — DIAZEPAM 10 MG: 5 TABLET ORAL at 09:43

## 2022-06-24 RX ADMIN — CALCIUM CARBONATE (ANTACID) CHEW TAB 500 MG 500 MG: 500 CHEW TAB at 20:50

## 2022-06-24 RX ADMIN — CLONIDINE HYDROCHLORIDE 0.1 MG: 0.1 TABLET ORAL at 01:04

## 2022-06-24 RX ADMIN — DIAZEPAM 10 MG: 5 TABLET ORAL at 17:16

## 2022-06-24 RX ADMIN — GABAPENTIN 600 MG: 300 CAPSULE ORAL at 19:24

## 2022-06-24 RX ADMIN — DIPHENHYDRAMINE HYDROCHLORIDE AND LIDOCAINE HYDROCHLORIDE AND ALUMINUM HYDROXIDE AND MAGNESIUM HYDRO 10 ML: KIT at 21:51

## 2022-06-24 RX ADMIN — FOLIC ACID 1 MG: 1 TABLET ORAL at 09:48

## 2022-06-24 RX ADMIN — SERTRALINE HYDROCHLORIDE 100 MG: 100 TABLET ORAL at 09:48

## 2022-06-24 RX ADMIN — Medication 5 MG: at 21:51

## 2022-06-24 RX ADMIN — ACETAMINOPHEN 975 MG: 325 TABLET, FILM COATED ORAL at 21:51

## 2022-06-24 RX ADMIN — ACETAMINOPHEN 975 MG: 325 TABLET, FILM COATED ORAL at 13:32

## 2022-06-24 RX ADMIN — DIPHENHYDRAMINE HYDROCHLORIDE AND LIDOCAINE HYDROCHLORIDE AND ALUMINUM HYDROXIDE AND MAGNESIUM HYDRO 10 ML: KIT at 13:32

## 2022-06-24 RX ADMIN — CLONIDINE HYDROCHLORIDE 0.1 MG: 0.1 TABLET ORAL at 09:47

## 2022-06-24 RX ADMIN — ACETAMINOPHEN 975 MG: 325 TABLET, FILM COATED ORAL at 05:32

## 2022-06-24 RX ADMIN — GABAPENTIN 600 MG: 300 CAPSULE ORAL at 10:16

## 2022-06-24 RX ADMIN — PANTOPRAZOLE SODIUM 40 MG: 40 TABLET, DELAYED RELEASE ORAL at 09:48

## 2022-06-24 RX ADMIN — CLONIDINE HYDROCHLORIDE 0.1 MG: 0.1 TABLET ORAL at 23:52

## 2022-06-24 RX ADMIN — CLONIDINE HYDROCHLORIDE 0.1 MG: 0.1 TABLET ORAL at 17:16

## 2022-06-24 RX ADMIN — NICOTINE 1 PATCH: 21 PATCH, EXTENDED RELEASE TRANSDERMAL at 09:49

## 2022-06-24 RX ADMIN — POTASSIUM & SODIUM PHOSPHATES POWDER PACK 280-160-250 MG 1 PACKET: 280-160-250 PACK at 09:49

## 2022-06-24 RX ADMIN — POTASSIUM & SODIUM PHOSPHATES POWDER PACK 280-160-250 MG 1 PACKET: 280-160-250 PACK at 19:24

## 2022-06-24 RX ADMIN — THIAMINE HCL TAB 100 MG 100 MG: 100 TAB at 09:48

## 2022-06-24 ASSESSMENT — ACTIVITIES OF DAILY LIVING (ADL)
ADLS_ACUITY_SCORE: 23

## 2022-06-24 NOTE — PLAN OF CARE
Neuro: A&Ox4. No anxiety, nausea, or dizziness. Complaints of mild headache relieved with tylenol. Visible tremors. CIWA of 3   Cardiac: SB/NSR. VSS.   Respiratory: Sating upper 90's on RA.  GI/: Adequate urine output. No BM  Diet/appetite: Tolerating regular diet. Eating well.  Activity:  Standby assist, up to chair and in halls.  Pain: At acceptable level on current regimen.   Skin: No new deficits noted.  LDA's:PIV    Plan: Continue with POC. Notify primary team with changes.

## 2022-06-24 NOTE — PLAN OF CARE
Goal Outcome Evaluation:    Neuro: A&Ox4. Scored 13 on CIWA d/t tremors, anxiety, nausea, valium given. Symptoms improved. Following CIWAS <8.   Cardiac: SR. VSS.   Respiratory: Sating >93% on RA.  GI/: Adequate urine output. BM X1  Diet/appetite: Tolerating regular diet. Eating well.  Activity:  stand by assist/independent up to chair and in halls.  Pain: c/o headache, prn tylenol given x1.  Skin: No new deficits noted.  LDA's: two left pivs.    Plan: Continue with POC. Notify primary team with changes.

## 2022-06-24 NOTE — PROGRESS NOTES
Addiction Team Social Work Note    Date of  Intervention: 06/24/22  Collaborated with:  Patient; Dr. Mack      Patient information: Addiction Medicine SW following for support, resources and linkage to care.      Intervention:  Chart reviewed.  Writer met with pt today and reviewed the following resources.  Pt states he needs to call his mom today to make sure he is welcome to return home with her.  He voices much fear and worry with this phone call.  Writer talked through housing resources, both for in the future and immediate.  Writer stated that the only options for immediate shelter is a homeless shelter or paying privately for a hotel.  Pt polite but clear with writer that he will not go to a shelter, but also conflicted as he has very little money in savings to afford a hotel.  He states that wherever he goes, it has to be a place that accepts dogs.  He has stayed at a hotel in the past for a few months but does not have the money in savings to do this again.  He also states he has left all of his friendships to avoid using so would not be able to stay with any friend or family.  He states his brother is watching his dog right now, but he unfortunately could not stay there either.  The following resources were given and reviewed with patient:    Out-Patient Mental Health & Addiction Line ([facility])  [phone]  *Call to schedule a Comprehensive assessment for treatment recommendations.  Right now, they are doing VIRTUAL assessments only.    **You can schedule an appointment at any [facility] through this number.  **You can schedule an appointment with one of our Addiction Medicine physicians.        Telephone Support:  [facility]  Telephone Recovery Support is completely free and confidential  All calls are made from the [facility] offices, Monday to Friday between 9 am and 6 pm.  Contact is by phone only, if you need in-person peer support or resource navigation  please contact [place] [facility] and ask for a Recovery Navigator ([phone] OR [email or web address]).  Calls are made by staff and volunteers with at least six months of recovery experience as a person in recovery or recovery marielena, who have completed our 6-hour training.  (Yesenia also talked about this resource).    Therapy options:   1. [facility]  [phone]  Multiple locations throughout the [place], including [place].  They provide individual counseling, Psychiatric care and several options (day or night) for out-patient chemical dependency programming.    [facility] Out-patient Substance Use program: [phone] **Informational brochure given.    2.  [facility]:  To make an appointment, call [phone].      HOUSING  --Apply for a Section 8 Housing Voucher:  [email or web address]  (June 22nd through June 28th at 12noon).  This waiting list is rarely open.      Housing Link:  website that has information about several types of housing, depending on your income or needs  [email or web address]      Sober Homes:  [email or web address]  [place] [employer]    Outpatient Treatment Thoughts:  [facility]:  Brochure given  [facility] Outpatient w/sober lodging: Brochure given with contact info.    Emergency programs through [facility]:  Services may include:    -Emergency temporary shelter  -Housing costs like rent payments, damage deposits, home repairs and utility bills  -Foreclosure prevention, moving expenses and transportation to relocate  -Extra food support for people on special diets  Contact for adults without minors: [phone]  Single Adults:  [employer] [facility] [phone]  [address] [place] [id number]    Reviewed appropriateness of these resources, asking if pt has any children.  (Adults w/children offer different housing options and contact info).  He states that he has an 18 year old  "daughter that lives with her mother.  He states he continues to pay formerly owed child support so this is an extra cost to him.  Pt states he has not had any contact with his daughter since she was 13.  Reviewed goals with patient who states that his understanding is that if he continues drinking he will die so \"I don't have a choice, do I\".    Also gave pt information on [employer] program (outpatient tx with sober lodging) stating that outpatient tx is in the morning so pt could work in the afternoon/evenings.    Assessment:  Supportive visit; review of resources and goals.    Plan:    Follow Up:  Writer available as needed.    Addendum: [id number]:  Writer paged with request to meet with pt and his mom, Gabriela.  Gabriela requests to speak with the SW about a CD tx plan at discharge.  Writer met with pt and Gabriela.  Asked Gabriela to wait outside the room so writer could meet with pt alone to discuss signing a EVELIN in order to talk with mom.  Pt agrees and signed the EVELIN. Writer placed the EVELIN in pt's hard chart.  Writer met with pt and Gabriela for over an hour.  Gabriela reports their current living situation and the stressors they are both under.  Gabriela reports much fear and concern over pt's alcohol use disorder and resulting health concerns.  She tells pt he must have a plan in place by time of discharge in order to return to her home.  Gabriela states that she has already changed the locks to their apartment as she feared he would be released from the hospital without her notice and she did not want him to be in her home without her present.  She fears he will relapse without having a plan in place as this has happened multiple times before.  She recalls multiple hospitalizations and ED visits as a result of his alcohol use and is afraid of the severity of his alcohol use disorder.  She also describes the stress they are under both living in a studio apartment.    She states that she arranged for an [employer] worker " (mental health worker) to meet with pt however pt declined.  They have discussed several resources and going to the doctor but pt did not follow through, which she states trust has been broken.    Her wish is for him to go directly to treatment, get health and stabilized and then in the future get a new career (pt is currently a ).  Pt hears her vision of his recovery but is also clear that he wants to find recovery his own way.  His 2 main concerns of not going to residential treatment is who will care for his dog and that he will not be able to work to make money.  Gabriela states that between she and pt's brother, the dog will be taken care of.  Gabriela states that because she has now started a new job, she is no longer concerned with him contributing financially.  Despite hearing this, pt stated he still will not go to residential tx.   She is clear with both pt and writer that she will not allow pt back in the home until he has appointments made with providers for next week.  (Pt has resources and contact info at the bedside).  He voices understanding of this requirement.  Gabriela also tells both pt and I that she and family are considering petitioning for commitment.  Writer encouraged pt to follow through on at least an outpatient treatment as a way to 'build his own case' to this possible petition.      Psychotherapeutic interventions (mostly DBT and CBT) provided during this visit.    Left the visit with the plan for both pt and mom to take some time and space to process through what was all discussed and how they both want to proceed tomorrow.  Gabriela hopes that pt will be able to be hospitalized throughout the weekend to give him more time to make calls, get plan in place, etc, though writer stated that this would likely only occur if pt remained medically unstable.  Writer told mom earlier in the visit that plan was for pt to discharge tomorrow. She plans to reach out to her own support system.     Also, will ask a weekend SW to reach out to pt to review his plan and if he will be going back to mom's place.  Pt has repeatedly denied going to a shelter, but may be worth mentioning [facility] if pt is not allowed back at mom's home.  Otherwise, pt may stay at a hotel.      My suggestion for getting appointments made would be:  [facility]: [phone] for 1.) a Comprehensive Assessment for treatment recs and referrals and 2.) an appointment with Dr. Chung or Dr. Murrell in the Addiction Medicine Clinic on [address].    -Would also recommend pt make a hospital follow up with his PCP.    (Writer had told pt to keep a documented record of his own of all calls made and appointments that he can show his mom to fulfill her requirements of having appointments made in order to return home with her.  Mom states that she will attend all appointments with patient to make sure he gets there.)    Due to regulation of Title 42 of the Code of Federal Regulations (CFR) Part 2: Confidentiality laws apply to this note and the information wherein.  Thus, this note cannot be copy and pasted into any other health care staff's note nor can it be included in general medical records sent to ANY outside agency without the patient's written consent.    PAULA Simmons  She/her/hers  Social Work Services  Addiction Team  [phone] work cell phone  [phone] pager  8:00am-4:30pm M/T/Th/F; Off Wednesday's

## 2022-06-24 NOTE — PROGRESS NOTES
[facility]    Medicine Progress Note - Hospitalist Service, GOLD TEAM 9    Date of Admission:  6/20/2022    Assessment & Plan            Jesus Martinez is a 37 year old male with a history of alcohol use disorder, hypertension, anxiety and depression, who was admitted to [facility] on 6/20/2022 after presenting to the ED with nausea, vomiting, and abdominal pain and found to be in acute alcohol withdrawal. Patient was started on CIWA with valium with improvement in symptoms. Addiction medicine and chem dep consulted to discuss outpatient/inpatient treatment options and medications for his alcohol use; materials given. Required a few days of valium for withdrawal but now tolerating being off of valium. Discussed options with patient and still does not want inpatient treatment and plan to discharge 6/25 with medications.      Acute Alcohol Withdrawal  Alcohol Use Disorder  Patient reports drinking 4-5 drinks of whiskey a few time a week prior to admission, with most recent drink more than 24 hours prior to admission. Ethanol level of <0.01 on admission. Endorses ongoing abdominal pain, nausea, vomiting, all likely consistent with alcohol use. Also with some hallucinations at home noted by patient and mom. Notes 15 year history of excessive alcohol consumption, but denies history of withdrawal seizures and DT, as well as previous treatment for alcohol use. Patient agreeable to talk to our addiction provider and chem dep liason. Started on naltrexone and gabapentin for symptoms. He is resistant to inpatient or outpatient treatment at this time due to need for work and will discharge home.  - Continue CIWA protocol   - continue gabapentin 600 mg TID    - continue naltrexone 50 mg daily  - Continue folic acid, multivitamin, and thiamine  - addiction med and chem dep consulted thanks for the recs     Nausea  Vomiting  Reports ongoing nausea and 6 episodes of mostly non-bloody  vomiting for 4-5 months, and has been seen as outpatient and in ED. Has been consuming alcohol as above as well as using marijuana daily. Feel likely related to alcohol use, but marijuana use could also be contributing to a possible cannabinoid hyperemesis syndrome. N/V likely related to gastritis from chronic alcohol consumption. Notes he did take PPI for a week and felt like that improved his symptoms. Will need to re-evaluate if not improved after sustained abstinence from alcohol use.  - Encourage oral hydration  - Avoid QT prolonging agents  - continue pantoprazole 40 mg daily      QTc Prolongation  EKG on admission with QTc of 600, which was taken after patient received Zofran, although has been taking continuously as outpatient. Patient also on Zoloft and hydroxyzine as outpatient, which can also cause QT prolongation so question if multifactorial. Follow up EKG with QTc of 497 so will continue to hold QT prolonging medications.  - Continue to monitor  - Cardiac monitoring  - Avoid QT prolonging agents     Anion Gap Metabolic Acidosis, resolved  Anion gap of 18 noted on admission. Likely related to excessive alcohol use prior to admission.    Leukocytosis, resolved   On admission, leukocytosis to 16 with ANC of 13.2. Patient endorses nausea, vomiting, abdominal pain, but denies any other infectious symptoms such as fever, chills, urinary symptoms, cough, shortness of breath. On evaluation, patient non-toxic appearing. Mildly tachycardic and hypertensive, but otherwise afebrile and hemodynamically stable. Unlikely to be infectious etiology behind leukocytosis, likely consistent with alcohol use and ongoing vomiting.   - Follow CBC     Transaminitis  Hyperbilirubinemia  Hepatic Steatosis  On admission, AST, ALT elevated in 2:1 pattern. Alk phos and t bili also elevated. No thrombocytopenia and INR within normal range. Likely consistent with excessive alcohol use. US abdomen performed on admission showed  hepatomegaly and hepatic steatosis without convincing evidence of cirrhosis or cholecystitis. No indication of acute alcoholic hepatitis, but is likely steatosis is related to alcohol use.   - CMP daily   - Follow CBC  - Encourage alcohol cessation     Elevated Blood Pressure  Tachycardia  History of hypertension  Blood pressure elevated to 150's systolic and heart rate to 110 on admission. Likely consistent with alcohol use. ECG with NSR, but did not QTc prolongation as noted above. Does have history of hypertension, but has not been taking PTA metoprolol for about one week.   - Continue clonidine PRN   - Hold PTA metoprolol for now  - Encourage alcohol cessation     Depression  Anxiety  Hold hydroxyzine PRN for now given current QTc prolongation.   - restarted zoloft      Tobacco use disorder Nicotine patches PRN.    Hyponatremia  Very mild. Will monitor    Hypomagnesemia, resolved  On admission, magnesium of 1.4. Likely due to poor oral intake in the setting of excessive alcohol use. Received 2 grams of  IV replacement in the ED. Repeat showed improvement and actually slightly elevated.   - magnesium replacement protocol     Hypophosphatemia, mild  On admission, phosphorus mildly decreased to 2.4. Likely due to poor oral intake. Will continue to monitor for now and encourage oral intake.  - Phos replacement protocol     Hypokalemia, resolved  On admission, potassium of 2.9. Likely due to poor oral intake in the setting of excessive alcohol use.  - RN managed potassium replacement protocol    Macrocytic anemia   Likely due to alcohol use   - continue to monitor        Diet: Combination Diet Regular Diet Adult  Snacks/Supplements Adult: Other; Gelatein (cherry) at 10am snack and magic cup (orange) at HS snack; Between Meals    DVT Prophylaxis: Pneumatic Compression Devices  Wheatley Catheter: Not present  Central Lines: None  Cardiac Monitoring: None  Code Status: Full Code      Disposition Plan   Expected Discharge:   "  Expected Discharge Date: 06/25/2022,  9:00 AM      Discharge Comments: Plan to discharge tomorrow AM if no issues arise   Anticipated discharge location:  Awaiting care coordination huddle  Delays:            The patient's care was discussed with the Bedside Nurse, Patient and addiction  Consultant.    Terrance Mack DO  Hospitalist Service, GOLD TEAM 9  [facility]  Securely message with the Vocera Web Console (learn more here)  Text page via [employer] Paging/Directory   Please see signed in provider for up to date coverage information      Clinically Significant Risk Factors Present on Admission              # Cachexia: Estimated body mass index is 17.09 kg/m  as calculated from the following:    Height as of this encounter: 1.753 m (5' 9\").    Weight as of this encounter: 52.5 kg (115 lb 11.9 oz).    # Severe Malnutrition: based on nutrition assessment     ______________________________________________________________________    Interval History     No acute events overnight. Feeling shaky and anxious today but not needing valium. Otherwise discussed discharge plans and is ok with plan to discharge tomorrow AM.     Four point ROS completed and negative unless listed above     Data reviewed today: I reviewed all medications, new labs and imaging results over the last 24 hours. I personally reviewed the EKG tracing showing NSR with prolonged QT.    Physical Exam   Vital Signs: Temp: 97.9  F (36.6  C) Temp src: Oral BP: 105/74 Pulse: 74   Resp: 18 SpO2: 99 % O2 Device: None (Room air)    Weight: 115 lbs 11.86 oz  General Appearance: In bed. No apparent distress   Respiratory: CTAB   Cardiovascular: RRR, no mumurs, no JVD   GI: Soft NTND  Skin: No lesions or rashes noted, no jaundice noted   Other: No suprapubic tenderness, no peripheral edema , minor hand tremors and tongue fasciculations     Data   Recent Labs   Lab 06/24/22  0547 06/23/22  1136 06/23/22  0544 06/22/22  0526 " 06/20/22 1940 06/20/22  1524   WBC 6.9  --  5.7  --   --  16.0*   HGB 13.4  --  12.7*  --   --  17.4   *  --  104*  --   --  95     --  168  --   --  217   INR  --   --   --   --   --  0.98   *  --  137 137   < > 136   POTASSIUM 4.0 4.1 3.4  3.4 3.6  3.6   < > 2.9*   CHLORIDE 102  --  104 105   < > 97   CO2 24  --  24 23   < > 21   BUN 7.9  --  5.1* 3.9*   < > 4*   CR 0.56*  --  0.55* 0.59*   < > 0.94   ANIONGAP 8  --  9 9   < > 18*   WENDY 9.1  --  8.9 8.5*   < > 9.8   GLC 92  --  111* 108*   < > 131*   ALBUMIN 3.6  --  3.2* 3.2*   < > 4.2   PROTTOTAL 6.4  --  5.8* 5.5*   < > 8.7   BILITOTAL 0.9  --  1.2 2.7*   < > 4.8*   ALKPHOS 155*  --  152* 162*   < > 260*   ALT 48  --  50 64*   < > 128*   AST 47  --  56* 104*   < > 294*   LIPASE  --   --   --   --   --  206    < > = values in this interval not displayed.     No results found for this or any previous visit (from the past 24 hour(s)).  Medications       cloNIDine  0.1 mg Oral Q8H     folic acid  1 mg Oral Daily     gabapentin  600 mg Oral TID     multivitamin w/minerals  1 tablet Oral Daily     naltrexone  50 mg Oral Daily     nicotine  1 patch Transdermal Daily     nicotine   Transdermal Q8H     pantoprazole  40 mg Oral QAM AC     potassium & sodium phosphates  1 packet Oral or Feeding Tube Q4H     sertraline  100 mg Oral Daily     sodium chloride (PF)  3 mL Intracatheter Q8H

## 2022-06-25 VITALS
DIASTOLIC BLOOD PRESSURE: 72 MMHG | BODY MASS INDEX: 17.14 KG/M2 | OXYGEN SATURATION: 99 % | TEMPERATURE: 97.7 F | WEIGHT: 115.74 LBS | RESPIRATION RATE: 16 BRPM | HEIGHT: 69 IN | SYSTOLIC BLOOD PRESSURE: 101 MMHG | HEART RATE: 72 BPM

## 2022-06-25 LAB
ALBUMIN SERPL BCG-MCNC: 3.7 G/DL (ref 3.5–5.2)
ALP SERPL-CCNC: 148 U/L (ref 40–129)
ALT SERPL W P-5'-P-CCNC: 44 U/L (ref 10–50)
ANION GAP SERPL CALCULATED.3IONS-SCNC: 10 MMOL/L (ref 7–15)
AST SERPL W P-5'-P-CCNC: 39 U/L (ref 10–50)
ATRIAL RATE - MUSE: 68 BPM
BILIRUB SERPL-MCNC: 0.9 MG/DL
BUN SERPL-MCNC: 7.2 MG/DL (ref 6–20)
CALCIUM SERPL-MCNC: 9.4 MG/DL (ref 8.6–10)
CHLORIDE SERPL-SCNC: 101 MMOL/L (ref 98–107)
CREAT SERPL-MCNC: 0.58 MG/DL (ref 0.67–1.17)
DEPRECATED HCO3 PLAS-SCNC: 26 MMOL/L (ref 22–29)
DIASTOLIC BLOOD PRESSURE - MUSE: NORMAL MMHG
ERYTHROCYTE [DISTWIDTH] IN BLOOD BY AUTOMATED COUNT: 13.1 % (ref 10–15)
GFR SERPL CREATININE-BSD FRML MDRD: >90 ML/MIN/1.73M2
GLUCOSE SERPL-MCNC: 96 MG/DL (ref 70–99)
HCT VFR BLD AUTO: 41.9 % (ref 40–53)
HGB BLD-MCNC: 13.7 G/DL (ref 13.3–17.7)
INTERPRETATION ECG - MUSE: NORMAL
MAGNESIUM SERPL-MCNC: 2 MG/DL (ref 1.7–2.3)
MCH RBC QN AUTO: 34.3 PG (ref 26.5–33)
MCHC RBC AUTO-ENTMCNC: 32.7 G/DL (ref 31.5–36.5)
MCV RBC AUTO: 105 FL (ref 78–100)
P AXIS - MUSE: 67 DEGREES
PHOSPHATE SERPL-MCNC: 4.4 MG/DL (ref 2.5–4.5)
PLATELET # BLD AUTO: 233 10E3/UL (ref 150–450)
POTASSIUM SERPL-SCNC: 4 MMOL/L (ref 3.4–5.3)
PR INTERVAL - MUSE: 154 MS
PROT SERPL-MCNC: 6.7 G/DL (ref 6.4–8.3)
QRS DURATION - MUSE: 76 MS
QT - MUSE: 382 MS
QTC - MUSE: 406 MS
R AXIS - MUSE: 57 DEGREES
RBC # BLD AUTO: 3.99 10E6/UL (ref 4.4–5.9)
SODIUM SERPL-SCNC: 137 MMOL/L (ref 136–145)
SYSTOLIC BLOOD PRESSURE - MUSE: NORMAL MMHG
T AXIS - MUSE: 50 DEGREES
VENTRICULAR RATE- MUSE: 68 BPM
WBC # BLD AUTO: 6.6 10E3/UL (ref 4–11)

## 2022-06-25 PROCEDURE — 250N000013 HC RX MED GY IP 250 OP 250 PS 637: Performed by: EMERGENCY MEDICINE

## 2022-06-25 PROCEDURE — 99239 HOSP IP/OBS DSCHRG MGMT >30: CPT | Performed by: STUDENT IN AN ORGANIZED HEALTH CARE EDUCATION/TRAINING PROGRAM

## 2022-06-25 PROCEDURE — 250N000013 HC RX MED GY IP 250 OP 250 PS 637: Performed by: INTERNAL MEDICINE

## 2022-06-25 PROCEDURE — 84100 ASSAY OF PHOSPHORUS: CPT | Performed by: INTERNAL MEDICINE

## 2022-06-25 PROCEDURE — 83735 ASSAY OF MAGNESIUM: CPT

## 2022-06-25 PROCEDURE — 250N000013 HC RX MED GY IP 250 OP 250 PS 637: Performed by: STUDENT IN AN ORGANIZED HEALTH CARE EDUCATION/TRAINING PROGRAM

## 2022-06-25 PROCEDURE — 80053 COMPREHEN METABOLIC PANEL: CPT

## 2022-06-25 PROCEDURE — 36415 COLL VENOUS BLD VENIPUNCTURE: CPT

## 2022-06-25 PROCEDURE — 85027 COMPLETE CBC AUTOMATED: CPT | Performed by: STUDENT IN AN ORGANIZED HEALTH CARE EDUCATION/TRAINING PROGRAM

## 2022-06-25 RX ORDER — HYDROXYZINE HYDROCHLORIDE 25 MG/1
25 TABLET, FILM COATED ORAL EVERY 6 HOURS PRN
Status: DISCONTINUED | OUTPATIENT
Start: 2022-06-25 | End: 2022-06-25 | Stop reason: HOSPADM

## 2022-06-25 RX ORDER — NALTREXONE HYDROCHLORIDE 50 MG/1
50 TABLET, FILM COATED ORAL DAILY
Qty: 90 TABLET | Refills: 1 | Status: SHIPPED | OUTPATIENT
Start: 2022-06-25 | End: 2022-12-08

## 2022-06-25 RX ORDER — LOPERAMIDE HCL 2 MG
2 CAPSULE ORAL 4 TIMES DAILY PRN
Qty: 30 CAPSULE | Refills: 0 | Status: SHIPPED | OUTPATIENT
Start: 2022-06-25

## 2022-06-25 RX ORDER — SERTRALINE HYDROCHLORIDE 100 MG/1
100 TABLET, FILM COATED ORAL DAILY
Qty: 90 TABLET | Refills: 1 | Status: SHIPPED | OUTPATIENT
Start: 2022-06-25 | End: 2022-12-08

## 2022-06-25 RX ORDER — GABAPENTIN 300 MG/1
600 CAPSULE ORAL 3 TIMES DAILY
Qty: 180 CAPSULE | Refills: 1 | Status: SHIPPED | OUTPATIENT
Start: 2022-06-25 | End: 2022-12-08

## 2022-06-25 RX ORDER — DIPHENHYDRAMINE HYDROCHLORIDE AND LIDOCAINE HYDROCHLORIDE AND ALUMINUM HYDROXIDE AND MAGNESIUM HYDRO
5-10 KIT EVERY 6 HOURS PRN
Qty: 119 ML | Refills: 3 | Status: SHIPPED | OUTPATIENT
Start: 2022-06-25

## 2022-06-25 RX ORDER — PANTOPRAZOLE SODIUM 40 MG/1
40 TABLET, DELAYED RELEASE ORAL
Qty: 42 TABLET | Refills: 0 | Status: SHIPPED | OUTPATIENT
Start: 2022-06-25 | End: 2022-08-06

## 2022-06-25 RX ORDER — HYDROXYZINE HYDROCHLORIDE 25 MG/1
50 TABLET, FILM COATED ORAL EVERY 6 HOURS PRN
Status: DISCONTINUED | OUTPATIENT
Start: 2022-06-25 | End: 2022-06-25 | Stop reason: HOSPADM

## 2022-06-25 RX ORDER — HYDROXYZINE HYDROCHLORIDE 50 MG/1
50 TABLET, FILM COATED ORAL EVERY 6 HOURS PRN
Qty: 120 TABLET | Refills: 0 | Status: SHIPPED | OUTPATIENT
Start: 2022-06-25 | End: 2022-12-08

## 2022-06-25 RX ADMIN — NALTREXONE HYDROCHLORIDE 50 MG: 50 TABLET, FILM COATED ORAL at 10:36

## 2022-06-25 RX ADMIN — PANTOPRAZOLE SODIUM 40 MG: 40 TABLET, DELAYED RELEASE ORAL at 10:35

## 2022-06-25 RX ADMIN — GABAPENTIN 600 MG: 300 CAPSULE ORAL at 14:42

## 2022-06-25 RX ADMIN — SERTRALINE HYDROCHLORIDE 100 MG: 100 TABLET ORAL at 10:34

## 2022-06-25 RX ADMIN — POTASSIUM & SODIUM PHOSPHATES POWDER PACK 280-160-250 MG 1 PACKET: 280-160-250 PACK at 00:33

## 2022-06-25 RX ADMIN — CLONIDINE HYDROCHLORIDE 0.1 MG: 0.1 TABLET ORAL at 10:35

## 2022-06-25 RX ADMIN — GABAPENTIN 600 MG: 300 CAPSULE ORAL at 10:35

## 2022-06-25 RX ADMIN — NICOTINE 1 PATCH: 21 PATCH, EXTENDED RELEASE TRANSDERMAL at 10:36

## 2022-06-25 RX ADMIN — FOLIC ACID 1 MG: 1 TABLET ORAL at 10:35

## 2022-06-25 RX ADMIN — Medication 1 TABLET: at 10:37

## 2022-06-25 ASSESSMENT — ACTIVITIES OF DAILY LIVING (ADL)
ADLS_ACUITY_SCORE: 24
ADLS_ACUITY_SCORE: 23
ADLS_ACUITY_SCORE: 24
ADLS_ACUITY_SCORE: 23
ADLS_ACUITY_SCORE: 23

## 2022-06-25 NOTE — PLAN OF CARE
DISCHARGE                         6/25/2022  4:28 PM  ----------------------------------------------------------------------------  Discharged to: Home  Via: private transportation  Accompanied by: self  Discharge Instructions:  regular diet, activity, medications, follow up appointments, when to call the MD, aftercare instructions.  Prescriptions: To be filled by   [facility]   pharmacy; medication list reviewed & sent with pt  Follow Up Appointments: arranged; information given  Belongings: All sent with pt  IV: d/c'd  Telemetry: d/c'd  Pt exhibits understanding of above discharge instructions; all questions answered.    Discharge Paperwork: Signed, copied, and sent home with patient.

## 2022-06-25 NOTE — DISCHARGE SUMMARY
[facility]  Hospitalist Discharge Summary      Date of Admission:  6/20/2022  Date of Discharge:  6/25/2022  Discharging Provider: Terrance Mack DO  Discharge Service: Hospitalist Service, GOLD TEAM 9    Discharge Diagnoses     Acute Alcohol Withdrawal  Alcohol Use Disorder  Nausea  Vomiting  QTc Prolongation  Anion Gap Metabolic Acidosis, resolved  Leukocytosis, resolved   Transaminitis  Hyperbilirubinemia  Hepatic Steatosis  Elevated Blood Pressure  Tachycardia  History of hypertension  Depression  Anxiety  Tobacco use disorder  Hyponatremia  Hypomagnesemia, resolved  Hypophosphatemia, mild  Hypokalemia, resolved  Macrocytic anemia     Follow-ups Needed After Discharge   Follow-up Appointments     Adult [facility] Follow-up and recommended labs and tests      Follow up with primary care provider, Naida Cedeno, within 7 days   for hospital follow- up.  No follow up labs or test are needed.      Appointments on [place] and/or [facility] (with [facility] or [facility]   provider or service). Call [phone] if you haven't heard regarding   these appointments within 7 days of discharge.    Please also schedule an appointment with our addiction providers on   discharge   Out-Patient Mental Health & Addiction Line ([facility])  [phone]  *Call to schedule a Comprehensive assessment for treatment   recommendations.  Right now, they are doing VIRTUAL assessments only.    **You can schedule an appointment at any [facility]   through this number.  **You can schedule an appointment with one of our Addiction Medicine   physicians.             Unresulted Labs Ordered in the Past 30 Days of this Admission     No orders found from 5/21/2022 to 6/21/2022.      These results will be followed up by NA    Discharge Disposition   Discharged to home  Condition at discharge: Stable    Hospital Course     Jesus Martinez is a 37 year old male with a history of alcohol  use disorder, hypertension, anxiety and depression, who was admitted to [facility] on 6/20/2022 after presenting to the ED with nausea, vomiting, and abdominal pain and found to be in acute alcohol withdrawal. Patient was started on CIWA with valium with improvement in symptoms. Addiction medicine and chem dep consulted to discuss outpatient/inpatient treatment options and medications for his alcohol use; materials given. Required a few days of valium for withdrawal but now tolerating being off of valium. Discussed options with patient and still does not want inpatient treatment and plan to discharge 6/25 with medications.      Acute Alcohol Withdrawal  Alcohol Use Disorder  Patient reports drinking 4-5 drinks of whiskey a few time a week prior to admission, with most recent drink more than 24 hours prior to admission. Ethanol level of <0.01 on admission. Endorses ongoing abdominal pain, nausea, vomiting, all likely consistent with alcohol use. Also with some hallucinations at home noted by patient and mom. Notes 15 year history of excessive alcohol consumption, but denies history of withdrawal seizures and DT, as well as previous treatment for alcohol use. Patient agreeable to talk to our addiction provider and chem dep liason. Started on naltrexone and gabapentin for symptoms. He is resistant to inpatient or outpatient treatment at this time due to need for work and will discharge home.  - continue gabapentin 600 mg TID   - continue naltrexone 50 mg daily  - Continue folic acid, multivitamin, and thiamine  - patient given resources for outpatient treatment options     Nausea, resolved  Vomiting, resolved   Reports ongoing nausea and 6 episodes of mostly non-bloody vomiting for 4-5 months, and has been seen as outpatient and in ED. Has been consuming alcohol as above as well as using marijuana daily. Feel likely related to alcohol use, but marijuana use could also be contributing to a possible cannabinoid hyperemesis  syndrome. N/V likely related to gastritis from chronic alcohol consumption. Notes he did take PPI for a week and felt like that improved his symptoms. This has resolved while in the hospital   - continue pantoprazole 40 mg daily for 6 weeks      QTc Prolongation  EKG on admission with QTc of 600, which was taken after patient received Zofran, although has been taking continuously as outpatient. Patient also on Zoloft and hydroxyzine as outpatient, which can also cause QT prolongation so question if multifactorial. Follow EKG with improving QTc now 406.  - would be aware if this when prescribing QT prolonging medication      Anion Gap Metabolic Acidosis, resolved  Anion gap of 18 noted on admission. Likely related to excessive alcohol use prior to admission.     Leukocytosis, resolved   On admission, leukocytosis to 16 with ANC of 13.2. Patient endorses nausea, vomiting, abdominal pain, but denies any other infectious symptoms such as fever, chills, urinary symptoms, cough, shortness of breath. On evaluation, patient non-toxic appearing. Mildly tachycardic and hypertensive, but otherwise afebrile and hemodynamically stable. Unlikely to be infectious etiology behind leukocytosis, likely consistent with alcohol use and ongoing vomiting.     Transaminitis  Hyperbilirubinemia  Hepatic Steatosis  On admission, AST, ALT elevated in 2:1 pattern. Alk phos and t bili also elevated. No thrombocytopenia and INR within normal range. Likely consistent with excessive alcohol use. US abdomen performed on admission showed hepatomegaly and hepatic steatosis without convincing evidence of cirrhosis or cholecystitis. No indication of acute alcoholic hepatitis, but is likely steatosis is related to alcohol use.   - Improving on discharge      Elevated Blood Pressure  Tachycardia  History of hypertension  Blood pressure elevated to 150's systolic and heart rate to 110 on admission. Likely consistent with alcohol use. ECG with NSR, but  did not QTc prolongation as noted above. Does have history of hypertension, but has not been taking PTA metoprolol for about one week.   - restart PTA meds      Depression  Anxiety  Hold hydroxyzine PRN for now given current QTc prolongation.   - restarted zoloft       Tobacco use disorder Nicotine patches PRN.     Hyponatremia, resolved   Very mild.     Hypomagnesemia, resolved  On admission, magnesium of 1.4. Likely due to poor oral intake in the setting of excessive alcohol use. Received 2 grams of  IV replacement in the ED. Repeat showed improvement and actually slightly elevated.   - magnesium replacement protocol      Hypophosphatemia, mild  On admission, phosphorus mildly decreased to 2.4. Likely due to poor oral intake. Will continue to monitor for now and encourage oral intake.  - Phos replacement protocol      Hypokalemia, resolved  On admission, potassium of 2.9. Likely due to poor oral intake in the setting of excessive alcohol use.  - RN managed potassium replacement protocol     Macrocytic anemia   Likely due to alcohol use   - continue to monitor     Consultations This Hospital Stay   NURSING TO CONSULT FOR VASCULAR ACCESS CARE IP CONSULT  ADDICTION SERVICE ADULT IP CONSULT FOR [place]  CHEMICAL DEPENDENCY IP CONSULT    Code Status   Full Code    Time Spent on this Encounter   ITerrance DO, personally saw the patient today and spent greater than 30 minutes discharging this patient.       Terrance Mack DO  [facility] [address] [address]  Phone: [phone]  ______________________________________________________________________    Physical Exam   Vital Signs: Temp: 96.8  F (36  C) Temp src: Axillary BP: 116/86 Pulse: 79   Resp: 16 SpO2: 97 % O2 Device: None (Room air)    Weight: 115 lbs 11.86 oz    General Appearance:  In bed. No apparent distress   Respiratory: CTAB   Cardiovascular: RRR, no mumurs, no JVD   GI: Soft NTND  Skin: No lesions or rashes  noted, no jaundice noted   Other:  No suprapubic tenderness, no peripheral edema , minor hand tremors noted        Primary Care Physician   Naida Cedeno    Discharge Orders      Adult Mental Health [facility] Referral      Reason for your hospital stay    You were in the hospital for acute alcohol withdrawal. This was treated with medications and you were seen by our addiction medicine providers and given resources. You will be discharged with plans to pursue further treatment on your own. For your alcohol use we are prescribing nalrexone which you should take daily to help with cravings and gabapentin which you should take as prescribed to help with symptoms of withdrawal.     Activity    Your activity upon discharge: activity as tolerated     Adult [facility] Follow-up and recommended labs and tests    Follow up with primary care provider, Naida Cedeno, within 7 days for hospital follow- up.  No follow up labs or test are needed.      Appointments on [place] and/or [facility] (with [facility] or [facility] provider or service). Call [phone] if you haven't heard regarding these appointments within 7 days of discharge.    Please also schedule an appointment with our addiction providers on discharge   Out-Patient Mental Health & Addiction Line ([place])  [phone]  *Call to schedule a Comprehensive assessment for treatment recommendations.  Right now, they are doing VIRTUAL assessments only.    **You can schedule an appointment at any [facility] through this number.  **You can schedule an appointment with one of our Addiction Medicine physicians.     Diet    Follow this diet upon discharge: Orders Placed This Encounter      Snacks/Supplements Adult: Other; Gelatein (cherry) at 10am snack and magic cup (orange) at HS snack; Between Meals      Combination Diet Regular Diet Adult       Significant Results and Procedures   Most Recent 3 CBC's:  Recent Labs   Lab Test 06/25/22  0613  06/24/22  0547 06/23/22  0544   WBC 6.6 6.9 5.7   HGB 13.7 13.4 12.7*   * 106* 104*    192 168     Most Recent 3 BMP's:  Recent Labs   Lab Test 06/25/22  0613 06/24/22  0547 06/23/22  1136 06/23/22  0544    134*  --  137   POTASSIUM 4.0 4.0 4.1 3.4  3.4   CHLORIDE 101 102  --  104   CO2 26 24  --  24   BUN 7.2 7.9  --  5.1*   CR 0.58* 0.56*  --  0.55*   ANIONGAP 10 8  --  9   WENDY 9.4 9.1  --  8.9   GLC 96 92  --  111*     Most Recent 2 LFT's:  Recent Labs   Lab Test 06/25/22  0613 06/24/22  0547   AST 39 47   ALT 44 48   ALKPHOS 148* 155*   BILITOTAL 0.9 0.9     Most Recent 3 INR's:  Recent Labs   Lab Test 06/20/22  1524   INR 0.98   ,   Results for orders placed or performed during the hospital encounter of 06/20/22   Abdomen US, limited (RUQ only)    Narrative    EXAMINATION: US ABDOMEN LIMITED  6/20/2022 7:16 PM      CLINICAL HISTORY: EtOH, liver enzyme elevation, eval for e/o  cirrhosis, eval for e/o cirrhosis    COMPARISON: None.        FINDINGS:  The liver is normal in contour, demonstrates diffusely increased  parenchymal echogenicity, and measures 18.2 cm. There is no  intrahepatic or extrahepatic biliary ductal dilatation.     The common bile duct measures 3. Mild biliary sludge. No evidence of  abnormal wall thickening, cholecystic fluid. Negative sonographic  Darden sign.    The visualized portions of the pancreas are normal in echogenicity.    The visualized upper abdominal aorta and inferior vena cava are  normal.      The kidneys are normal in position and echogenicity. The right kidney  measures 10.9 cm. There is no hydronephrosis or nephrolithiasis.    Visualized IVC and aorta are unremarkable.      Impression    IMPRESSION:   Hepatomegaly and hepatic steatosis. No convincing morphologic evidence  of cirrhosis.    I have personally reviewed the examination and initial interpretation  and I agree with the findings.    FADIA BLUE,          SYSTEM ID:  [id number]       Discharge  Medications   Current Discharge Medication List      START taking these medications    Details   gabapentin (NEURONTIN) 300 MG capsule Take 2 capsules (600 mg) by mouth 3 times daily  Qty: 180 capsule, Refills: 1    Associated Diagnoses: Alcohol withdrawal syndrome with perceptual disturbance (H); Alcoholism (H); Severe alcohol dependence (H)      hydrOXYzine (ATARAX) 50 MG tablet Take 1 tablet (50 mg) by mouth every 6 hours as needed for other (adjuvant pain)  Qty: 120 tablet, Refills: 0    Associated Diagnoses: Alcohol withdrawal syndrome with perceptual disturbance (H); Anxiety      loperamide (IMODIUM) 2 MG capsule Take 1 capsule (2 mg) by mouth 4 times daily as needed for diarrhea  Qty: 30 capsule, Refills: 0    Associated Diagnoses: Diarrhea due to malabsorption      magic mouthwash suspension, diphenhydrAMINE, lidocaine, aluminum-magnesium & simethicone, (FIRST-MOUTHWASH BLM) compounding kit Swish and swallow 5-10 mLs in mouth every 6 hours as needed for mouth sores  Qty: 119 mL, Refills: 3    Associated Diagnoses: Thrush      naltrexone (DEPADE/REVIA) 50 MG tablet Take 1 tablet (50 mg) by mouth daily  Qty: 90 tablet, Refills: 1    Associated Diagnoses: Alcohol withdrawal syndrome with perceptual disturbance (H); Alcoholism (H); Severe alcohol dependence (H)         CONTINUE these medications which have CHANGED    Details   pantoprazole (PROTONIX) 40 MG EC tablet Take 1 tablet (40 mg) by mouth every morning (before breakfast) for 42 days  Qty: 42 tablet, Refills: 0    Associated Diagnoses: Acute superficial gastritis without hemorrhage      sertraline (ZOLOFT) 100 MG tablet Take 1 tablet (100 mg) by mouth daily  Qty: 90 tablet, Refills: 1    Associated Diagnoses: Anxiety; Moderate episode of recurrent major depressive disorder (H)         CONTINUE these medications which have NOT CHANGED    Details   diphenhydrAMINE-acetaminophen (TYLENOL PM)  MG tablet Take 1 tablet by mouth nightly as needed for sleep       doxylamine (UNISOM) 25 MG TABS tablet Take 2 tablets (50 mg) by mouth nightly as needed for sleep  Qty: 60 tablet, Refills: 1    Associated Diagnoses: Persistent insomnia      hydrOXYzine (VISTARIL) 50 MG capsule Take 1 capsule (50 mg) by mouth 4 times daily as needed for anxiety  Qty: 90 capsule, Refills: 1    Associated Diagnoses: Anxiety      metoprolol succinate ER (TOPROL-XL) 25 MG 24 hr tablet Take 1 tablet (25 mg) by mouth daily  Qty: 90 tablet, Refills: 1    Associated Diagnoses: Benign essential hypertension; Sinus tachycardia      multivitamin (ONE-DAILY) tablet Take 1 tablet by mouth daily  Qty: 90 tablet, Refills: 3    Associated Diagnoses: Vitamin B deficiency      ondansetron (ZOFRAN ODT) 4 MG ODT tab Take 1 tablet by mouth as needed      SM NICOTINE 14 MG/24HR 24 hr patch       thiamine (B-1) 100 MG tablet Take 1 tablet (100 mg) by mouth daily  Qty: 90 tablet, Refills: 3    Associated Diagnoses: Vitamin B deficiency      vitamin B-12 (CYANOCOBALAMIN) 1000 MCG tablet Take 1 tablet (1,000 mcg) by mouth daily  Qty: 90 tablet, Refills: 3    Associated Diagnoses: Vitamin B deficiency         STOP taking these medications       magic mouthwash suspension (diphenhydrAMINE, lidocaine, aluminum-magnesium & simethicone) Comments:   Reason for Stopping:             Allergies   Allergies   Allergen Reactions     Wellbutrin [Bupropion]      Suicidal thoughts

## 2022-06-25 NOTE — PLAN OF CARE
Neuro: A&Ox4.   Cardiac: SR. VSS.   Respiratory: Sating *>92%on RA.  GI/: Adequate urine output.  Diet/appetite: Tolerating regular diet. Ate well.  Activity:  Independent, up to chair and bathroom.  Pain: At acceptable level on current regimen.   Skin: No new deficits noted.  LDA's: 2 L-PIV    Plan: Continue with POC. Notify primary team with changes.

## 2022-06-25 NOTE — PLAN OF CARE
0800-8414:  Neuro: A&Ox4. Anxious this morning, mild nausea. Valium 10mg given x1 for CIWA level of 8 this morning.  Headache had improved this morning, but returned this afternoon. Tylenol 975mg given.  Visible tremors.   Cardiac: SR/SB. VSS.           Respiratory: Sating upper 90's on RA.  GI/: Adequate urine output. No BM today.  Diet/appetite: Tolerating regular diet. Appetite fair/good.  Activity:  SBA this morning, independent to bathroom this afternoon.  Pain: At acceptable level on current regimen.   Skin: No new deficits noted.  LDA's: PIVx2.  Phos level 2.4. Receiving TID Neutra-phos today.    Chem Dep  came to talk at mom's request. Discussed plans post-discharge.     Plan: Continue with POC. Notify primary team with changes/concerns.

## 2022-06-25 NOTE — PLAN OF CARE
Neuro: A&Ox4. Mild tremors  Cardiac: NSR. VSS.   Respiratory: Sating upper 90's on RA.  GI/: Adequate urine output. Voids in bathroom. No BM   Diet/appetite: Tolerating regular diet. Eating well.  Activity:  Standby Assist, up to chair and in halls.  Pain: At acceptable level on current regimen. Complaints of headache relieved with tylenol.  Skin: No new deficits noted.  LDA's: PIV    Plan: Continue with POC. Notify primary team with changes.

## 2022-06-27 ENCOUNTER — PATIENT OUTREACH (OUTPATIENT)
Dept: CARE COORDINATION | Facility: CLINIC | Age: 37
End: 2022-06-27

## 2022-06-27 DIAGNOSIS — Z71.89 OTHER SPECIFIED COUNSELING: ICD-10-CM

## 2022-06-27 NOTE — PROGRESS NOTES
Clinic Care Coordination Contact  [facility]/Voicemail       Clinical Data: Care Coordinator Outreach  Outreach attempted x 1.  No answer, unable to lvm    Plan: Care Coordinator will try to reach patient again in 1-2 business days.      KRYSTEN Bynum  [phone]  [facility]

## 2022-06-28 NOTE — PROGRESS NOTES
Clinic Care Coordination Contact  [facility]/Voicemail       Clinical Data: Care Coordinator Outreach  Outreach attempted x 2.  No answer, unable to lvm    Plan: Care Coordinator will do no further outreaches at this time.        KRYSTEN Bynum  [phone]  [facility]

## 2022-08-22 ENCOUNTER — NURSE TRIAGE (OUTPATIENT)
Dept: NURSING | Facility: CLINIC | Age: 37
End: 2022-08-22

## 2022-08-22 NOTE — TELEPHONE ENCOUNTER
Nurse Triage SBAR    Is this a 2nd Level Triage? No    Situation:   Spoke with mom about 37 yr old Jesus who c/o:  (verbal permission given by patient to speak with his mom)    Dizziness and balance issues x 1 month.  Patient states he fainted 2 days ago and his dizziness has been worse since fainting.  Having a hard time controlling his fingers and holding things in his hands.    Background:  States he had a seizure about one month ago.  Ambulance came and patient was admitted to the hospital.  Per Chart review, hx of admissions for alcohol use.      Assessment:   Evaluation needed.  Protocol Recommended Disposition:   Call  now.    Recommendation:   Advised mom to call 911 now per protocol.  Mom declines and intends to drive patient to hospital ED now.     Kerry Henderson RN  [facility]    Reason for Disposition    Fainted, and still feels dizzy afterwards    Additional Information    Negative: SEVERE difficulty breathing (e.g., struggling for each breath, speaks in single words)    Negative: Shock suspected (e.g., cold/pale/clammy skin, too weak to stand, low BP, rapid pulse)    Negative: Difficult to awaken or acting confused (e.g., disoriented, slurred speech)    Protocols used: DIZZINESS-A-OH

## 2022-12-08 DIAGNOSIS — F33.1 MODERATE EPISODE OF RECURRENT MAJOR DEPRESSIVE DISORDER (H): ICD-10-CM

## 2022-12-08 DIAGNOSIS — F10.20 ALCOHOLISM (H): ICD-10-CM

## 2022-12-08 DIAGNOSIS — F10.20 SEVERE ALCOHOL DEPENDENCE (H): ICD-10-CM

## 2022-12-08 DIAGNOSIS — F10.932 ALCOHOL WITHDRAWAL SYNDROME WITH PERCEPTUAL DISTURBANCE (H): ICD-10-CM

## 2022-12-08 DIAGNOSIS — F41.9 ANXIETY: ICD-10-CM

## 2022-12-08 RX ORDER — NALTREXONE HYDROCHLORIDE 50 MG/1
50 TABLET, FILM COATED ORAL DAILY
Qty: 90 TABLET | Refills: 0 | Status: SHIPPED | OUTPATIENT
Start: 2022-12-08

## 2022-12-08 RX ORDER — SERTRALINE HYDROCHLORIDE 100 MG/1
100 TABLET, FILM COATED ORAL DAILY
Qty: 90 TABLET | Refills: 0 | Status: SHIPPED | OUTPATIENT
Start: 2022-12-08

## 2022-12-08 RX ORDER — HYDROXYZINE HYDROCHLORIDE 50 MG/1
50 TABLET, FILM COATED ORAL EVERY 6 HOURS PRN
Qty: 120 TABLET | Refills: 0 | Status: SHIPPED | OUTPATIENT
Start: 2022-12-08

## 2022-12-08 RX ORDER — GABAPENTIN 300 MG/1
600 CAPSULE ORAL 3 TIMES DAILY
Qty: 180 CAPSULE | Refills: 0 | Status: SHIPPED | OUTPATIENT
Start: 2022-12-08

## 2022-12-08 NOTE — TELEPHONE ENCOUNTER
Reason for Call:  Medication or medication refill:    Do you use a [facility]?  Name of the pharmacy and phone number for the current request:  [facility] off [address]    Name of the medication requested: labetalol, sertraline (ZOLOFT) 100 MG tablet, hydrOXYzine (ATARAX) 50 MG tablet, gabapentin (NEURONTIN) 300 MG capsule, naltrexone (DEPADE/REVIA) 50 MG tablet, B12, B1    Other request: Patient needs refills on these medications.  Is scheduled for a telephone visit with Dr. Loyola on 12-.  Patient was scheduled for visit today, but had to cancel due to work.    Can we leave a detailed message on this number? YES    Phone number patient can be reached at: Home number on file [phone] (home)    Best Time: anytime    Call taken on 12/8/2022 at 12:18 PM by Pari Johnson

## 2022-12-11 ENCOUNTER — NURSE TRIAGE (OUTPATIENT)
Dept: NURSING | Facility: CLINIC | Age: 37
End: 2022-12-11

## 2022-12-11 NOTE — TELEPHONE ENCOUNTER
Patient is calling to report a raccoon bite.    It happened about 2 hours ago.  He was trying to save the raccoon out of the drain.  He has has an open areas on his hand and a 2in open area in his thumb.  He has been able to stop the bleeding.    Disposition:  ED now.  Care advice given. Pt verbalized understanding.    Lucila Alcazar RN, BSN Nurse Triage Advisor 12/11/2022 12:24 AM       Reason for Disposition    [1] Any break in skin from BITE (e.g., cut, puncture or scratch) AND[2] WILD animal at risk for RABIES (e.g., bat, raccoon, cunha, skunk, coyote, other carnivores)    Additional Information    Negative: [1] Major bleeding (e.g., actively dripping or spurting) AND [2] can't be stopped    Negative: Sounds like a life-threatening emergency to the triager    Protocols used: ANIMAL BITE-A-

## 2022-12-11 NOTE — TELEPHONE ENCOUNTER
Triage Call    Pt calling with report that he was bitten by a raccoon on his Left thumb while out walking his dog.    Pt says he realized 2 raccoons were stuck in the  when his dog started barking at them so he went into the  to get them out.    As he was pulling them out, one of them bit his Left thumb through his gloves.  Pt says his skin was punctured and bleeding from the bite.    Triaged to disposition of Go to ED Now, and Care Advice given per Adult Animal Bite Guideline.    Sandy Wolfe RN      Reason for Disposition    [1] Any break in skin from BITE (e.g., cut, puncture or scratch) AND[2] WILD animal at risk for RABIES (e.g., bat, raccoon, cunha, skunk, coyote, other carnivores)    Additional Information    Negative: [1] Major bleeding (e.g., actively dripping or spurting) AND [2] can't be stopped    Negative: Sounds like a life-threatening emergency to the triager    Negative: Snake bite    Negative: Bite, wound, or sting from fish    Protocols used: ANIMAL BITE-A-

## 2025-08-15 ENCOUNTER — TRANSFERRED RECORDS (OUTPATIENT)
Dept: HEALTH INFORMATION MANAGEMENT | Facility: CLINIC | Age: 40
End: 2025-08-15
Payer: COMMERCIAL